# Patient Record
Sex: MALE | Employment: OTHER | URBAN - METROPOLITAN AREA
[De-identification: names, ages, dates, MRNs, and addresses within clinical notes are randomized per-mention and may not be internally consistent; named-entity substitution may affect disease eponyms.]

---

## 2023-10-02 ENCOUNTER — DOCUMENTATION (OUTPATIENT)
Dept: POST ACUTE CARE | Facility: EXTERNAL LOCATION | Age: 78
End: 2023-10-02
Payer: COMMERCIAL

## 2023-10-02 DIAGNOSIS — K21.9 GASTROESOPHAGEAL REFLUX DISEASE WITHOUT ESOPHAGITIS: Primary | ICD-10-CM

## 2023-10-02 DIAGNOSIS — E78.5 HYPERLIPIDEMIA LDL GOAL <100: ICD-10-CM

## 2023-10-02 DIAGNOSIS — J44.89 COPD (CHRONIC OBSTRUCTIVE PULMONARY DISEASE) WITH CHRONIC BRONCHITIS (MULTI): ICD-10-CM

## 2023-10-02 NOTE — PROGRESS NOTES
Patient is seen at [facility] today.  He is in usual state of health.He denies any fever or chills.  Has no headache or visual disturbances.  Denies any chest pain or palpitation.  He has some dry cough.  Denies any nausea vomiting pain abdomen.  He has No urinary symptoms.  He has chronic weakness of the legs.  He ambulates in the wheelchair in the nursing home.    Past medical history:  Hyperglycemia  Obesity  Hyperlipidemia  Anemia  Urethral foreign body  Bilateral hydronephrosis  Depression  History of tibia and fibula fracture  Obesity  Suicidal ideation  Anxiety  Knee osteoarthritis  Cerebral palsy   Benign prostatic enlargement  GERD     On examination:  General examination: There is no acute discomfort  Eyes: There is no pallor jaundice pupils are equal and reactive to light  Oral cavity throat normal  Neck: There is no JVD or carotid bruit  Lungs: Clear to auscultation  CVS: Rhythm is regular there is no gallop murmur  Abdomen: There is no organomegaly tenderness  CNS: Weakness of the legs is present  Legs: Trace of ankle advise present  Skin: No rash      Provider Impression     1. Smoking: Patient is educated about health risk including risk of lung complications malignancy and cardiovascular events.  Encouraged to stop smoking completely.  2. Hyperlipidemia: Continue with Zocor  3. Gastroesophageal reflux disease: Symptoms are controlled on Protonix  4. Hypertension: Controlled on the current dose of lisinopril and Norvasc  5. COPD: Stable on current inhalers  6. Depression: Stable on current dose of venlafaxine   7.  Overweight: Diet excise activity discussed with the patient.

## 2023-11-01 ENCOUNTER — NURSING HOME VISIT (OUTPATIENT)
Dept: POST ACUTE CARE | Facility: EXTERNAL LOCATION | Age: 78
End: 2023-11-01
Payer: COMMERCIAL

## 2023-11-01 DIAGNOSIS — R73.9 HYPERGLYCEMIA: Primary | ICD-10-CM

## 2023-11-01 DIAGNOSIS — N40.0 BENIGN PROSTATIC HYPERPLASIA WITHOUT LOWER URINARY TRACT SYMPTOMS: ICD-10-CM

## 2023-11-01 DIAGNOSIS — E78.49 OTHER HYPERLIPIDEMIA: ICD-10-CM

## 2023-11-01 PROCEDURE — 99308 SBSQ NF CARE LOW MDM 20: CPT | Performed by: INTERNAL MEDICINE

## 2023-11-01 NOTE — PROGRESS NOTES
Mr. Dinh is seen at [facility] today.  He is sitting comfortably in wheelchair.  He has no fever or chills.  Has some dry cough.  Unfortunately continues to smoke.  He has no chest pain or palpitation.  Denies any recent change in bowel habit.  He has no polyuria, polydipsia any other symptom uncontrolled hyperglycemia.  He has no polyuria, polydipsia any other symptom uncontrolled hyperglycemia.  Review of other systems are negative.    Past medical history:  Hyperglycemia  Obesity  Hyperlipidemia  Anemia  Urethral foreign body  Bilateral hydronephrosis  Depression  History of tibia and fibula fracture  Obesity  Suicidal ideation  Anxiety  Knee osteoarthritis  Cerebral palsy   Benign prostatic enlargement  GERD     On examination:  General examination: Overweight  Eyes: There is no pallor or jaundice  Vital signs: Temperature is 97.6, heart rate is 80/min, blood pressure is 124/68, respiratory rate is 17, oxygen saturation 97%, blood sugar is 138  Neck: There is no carotid bruit JVD  Lungs: Clear to auscultation  CVS: Heart sounds are regular there is no gallop murmur  Abdomen: Normal exam  CNS: Leg weakness is present  Legs: There is no edema  Skin: No rash    Assessment and plan:  1.  Hyperlipidemia: Continue the current dose of Zocor  2.  Hyperglycemia: Blood sugar levels are acceptable last hemoglobin A1c was 5.9  3.  Benign prostatic hypertrophy: Patient has no urinary symptoms we will continue with the Flomax  4.  Hypertension: Controlled on the current dose of lisinopril and Norvasc  5.  Gastroesophageal reflux disease: Symptoms are controlled on pantoprazole  6.  COPD: Stable on current inhalers  7.  Obesity: Encouraged to cut down calorie intake.  His exercise capacity is limited

## 2023-11-01 NOTE — LETTER
Patient: Gonzalez Dinh  : 1945    Encounter Date: 2023    Mr. Dinh is seen at [facility] today.  He is sitting comfortably in wheelchair.  He has no fever or chills.  Has some dry cough.  Unfortunately continues to smoke.  He has no chest pain or palpitation.  Denies any recent change in bowel habit.  He has no polyuria, polydipsia any other symptom uncontrolled hyperglycemia.  He has no polyuria, polydipsia any other symptom uncontrolled hyperglycemia.  Review of other systems are negative.    Past medical history:  Hyperglycemia  Obesity  Hyperlipidemia  Anemia  Urethral foreign body  Bilateral hydronephrosis  Depression  History of tibia and fibula fracture  Obesity  Suicidal ideation  Anxiety  Knee osteoarthritis  Cerebral palsy   Benign prostatic enlargement  GERD     On examination:  General examination: Overweight  Eyes: There is no pallor or jaundice  Vital signs: Temperature is 97.6, heart rate is 80/min, blood pressure is 124/68, respiratory rate is 17, oxygen saturation 97%, blood sugar is 138  Neck: There is no carotid bruit JVD  Lungs: Clear to auscultation  CVS: Heart sounds are regular there is no gallop murmur  Abdomen: Normal exam  CNS: Leg weakness is present  Legs: There is no edema  Skin: No rash    Assessment and plan:  1.  Hyperlipidemia: Continue the current dose of Zocor  2.  Hyperglycemia: Blood sugar levels are acceptable last hemoglobin A1c was 5.9  3.  Benign prostatic hypertrophy: Patient has no urinary symptoms we will continue with the Flomax  4.  Hypertension: Controlled on the current dose of lisinopril and Norvasc  5.  Gastroesophageal reflux disease: Symptoms are controlled on pantoprazole  6.  COPD: Stable on current inhalers  7.  Obesity: Encouraged to cut down calorie intake.  His exercise capacity is limited      Electronically Signed By: Jose Hernandez MD   23  7:25 PM

## 2023-12-02 ENCOUNTER — NURSING HOME VISIT (OUTPATIENT)
Dept: POST ACUTE CARE | Facility: EXTERNAL LOCATION | Age: 78
End: 2023-12-02
Payer: COMMERCIAL

## 2023-12-02 DIAGNOSIS — R73.9 HYPERGLYCEMIA: Primary | ICD-10-CM

## 2023-12-02 DIAGNOSIS — K21.9 GASTROESOPHAGEAL REFLUX DISEASE WITHOUT ESOPHAGITIS: ICD-10-CM

## 2023-12-02 DIAGNOSIS — I10 PRIMARY HYPERTENSION: ICD-10-CM

## 2023-12-02 DIAGNOSIS — J44.89 COPD (CHRONIC OBSTRUCTIVE PULMONARY DISEASE) WITH CHRONIC BRONCHITIS (MULTI): ICD-10-CM

## 2023-12-02 DIAGNOSIS — E78.49 OTHER HYPERLIPIDEMIA: ICD-10-CM

## 2023-12-02 PROCEDURE — 99308 SBSQ NF CARE LOW MDM 20: CPT | Performed by: INTERNAL MEDICINE

## 2023-12-02 NOTE — PROGRESS NOTES
Patient is seen at [facility] today.  He denies any fever chill anorexia.  Has some dry cough without expectoration.  Has no chest pain or palpitation.  Denies any nausea matting pain abdomen.  He gets occasional heartburn he has generalized weakness.  Denies any polyuria, polydipsia any other symptom uncontrolled hyperglycemia.  He has no urinary symptoms.  Review of other systems are negative.  He has no urinary symptoms.  Review of other systems are negative.    Past medical history:  Hyperglycemia  Obesity  Hyperlipidemia  Anemia  Urethral foreign body  Bilateral hydronephrosis  Depression  History of tibia and fibula fracture  Obesity  Suicidal ideation  Anxiety  Knee osteoarthritis  Cerebral palsy   Benign prostatic enlargement  GERD     On examination:  General examination: Overweight.  There is no acute discomfort  Vital signs: Pulse is 72/min, blood pressure 117/72, respiratory rate is 18, oxygen saturation 98% on room air  Neck: There is no carotid bruit or thyroid enlargement  Lungs: Clear to auscultation  CVS: Heart sounds are regular there is no gallop murmur  Abdomen: Soft nontender  Legs: No edema    Assessment and plan:  1.  Hypertension: Controlled on the current dose of Norvasc, lisinopril  2.  Gastroesophageal reflux disease: Symptoms are controlled on Protonix  3.  Hyperlipidemia: Continue simvastatin  4.  Chronic constipation: Continue laxatives as needed  5.  Depression: Stable on current dose of venlafaxine

## 2023-12-02 NOTE — LETTER
Patient: Gonzalez Dinh  : 1945    Encounter Date: 2023    Patient is seen at [facility] today.  He denies any fever chill anorexia.  Has some dry cough without expectoration.  Has no chest pain or palpitation.  Denies any nausea matting pain abdomen.  He gets occasional heartburn he has generalized weakness.  Denies any polyuria, polydipsia any other symptom uncontrolled hyperglycemia.  He has no urinary symptoms.  Review of other systems are negative.  He has no urinary symptoms.  Review of other systems are negative.    Past medical history:  Hyperglycemia  Obesity  Hyperlipidemia  Anemia  Urethral foreign body  Bilateral hydronephrosis  Depression  History of tibia and fibula fracture  Obesity  Suicidal ideation  Anxiety  Knee osteoarthritis  Cerebral palsy   Benign prostatic enlargement  GERD     On examination:  General examination: Overweight.  There is no acute discomfort  Vital signs: Pulse is 72/min, blood pressure 117/72, respiratory rate is 18, oxygen saturation 98% on room air  Neck: There is no carotid bruit or thyroid enlargement  Lungs: Clear to auscultation  CVS: Heart sounds are regular there is no gallop murmur  Abdomen: Soft nontender  Legs: No edema    Assessment and plan:  1.  Hypertension: Controlled on the current dose of Norvasc, lisinopril  2.  Gastroesophageal reflux disease: Symptoms are controlled on Protonix  3.  Hyperlipidemia: Continue simvastatin  4.  Chronic constipation: Continue laxatives as needed  5.  Depression: Stable on current dose of venlafaxine      Electronically Signed By: Jose Hernandez MD   23  4:15 PM

## 2024-01-08 ENCOUNTER — NURSING HOME VISIT (OUTPATIENT)
Dept: POST ACUTE CARE | Facility: EXTERNAL LOCATION | Age: 79
End: 2024-01-08
Payer: COMMERCIAL

## 2024-01-08 DIAGNOSIS — I10 PRIMARY HYPERTENSION: ICD-10-CM

## 2024-01-08 DIAGNOSIS — K21.9 GASTROESOPHAGEAL REFLUX DISEASE WITHOUT ESOPHAGITIS: ICD-10-CM

## 2024-01-08 DIAGNOSIS — E78.49 OTHER HYPERLIPIDEMIA: ICD-10-CM

## 2024-01-08 DIAGNOSIS — J44.89 COPD (CHRONIC OBSTRUCTIVE PULMONARY DISEASE) WITH CHRONIC BRONCHITIS (MULTI): ICD-10-CM

## 2024-01-08 DIAGNOSIS — R73.9 HYPERGLYCEMIA: Primary | ICD-10-CM

## 2024-01-08 PROCEDURE — 99307 SBSQ NF CARE SF MDM 10: CPT | Performed by: INTERNAL MEDICINE

## 2024-01-08 NOTE — LETTER
Patient: Gonzalez Dinh  : 1945    Encounter Date: 2024    He is seen at [facility] today he is comfortable.  Denies any fever or chills.  Has no chest pain or palpitation.  Has no shortness of breath or wheezing.  He has no nausea matting pain abdomen.  Has no polyuria, polydipsia any other symptom uncontrolled hyperglycemia.  Review of other systems are negative.    Past medical history:  Hyperglycemia  Obesity  Hyperlipidemia  Anemia  Urethral foreign body  Bilateral hydronephrosis  Depression  History of tibia and fibula fracture  Obesity  Suicidal ideation  Anxiety  Knee osteoarthritis  Cerebral palsy   Benign prostatic enlargement  GERD   On examination:  General examination: Overweight  Eyes: There is no pallor or jaundice  Lungs: Clear to auscultation  CVS: Heart sounds are regular there is no gallop murmur  Legs: No edema    Assessment and plan  1.  Hypertension: Controlled on the current dose of Norvasc, lisinopril  2.  Hyperlipidemia continue Zocor  3.  Hyperglycemia: Recent blood sugar levels are acceptable  4.  Depression: Stable on current medications  5.  History of benign prostatic hypertrophy: Patient has no urinary symptoms he is on Flomax      Electronically Signed By: Jose Hernandez MD   24  9:08 PM

## 2024-01-09 NOTE — PROGRESS NOTES
He is seen at [facility] today he is comfortable.  Denies any fever or chills.  Has no chest pain or palpitation.  Has no shortness of breath or wheezing.  He has no nausea matting pain abdomen.  Has no polyuria, polydipsia any other symptom uncontrolled hyperglycemia.  Review of other systems are negative.    Past medical history:  Hyperglycemia  Obesity  Hyperlipidemia  Anemia  Urethral foreign body  Bilateral hydronephrosis  Depression  History of tibia and fibula fracture  Obesity  Suicidal ideation  Anxiety  Knee osteoarthritis  Cerebral palsy   Benign prostatic enlargement  GERD   On examination:  General examination: Overweight  Eyes: There is no pallor or jaundice  Lungs: Clear to auscultation  CVS: Heart sounds are regular there is no gallop murmur  Legs: No edema    Assessment and plan  1.  Hypertension: Controlled on the current dose of Norvasc, lisinopril  2.  Hyperlipidemia continue Zocor  3.  Hyperglycemia: Recent blood sugar levels are acceptable  4.  Depression: Stable on current medications  5.  History of benign prostatic hypertrophy: Patient has no urinary symptoms he is on Flomax

## 2024-02-02 ENCOUNTER — NURSING HOME VISIT (OUTPATIENT)
Dept: POST ACUTE CARE | Facility: EXTERNAL LOCATION | Age: 79
End: 2024-02-02
Payer: COMMERCIAL

## 2024-02-02 DIAGNOSIS — I10 PRIMARY HYPERTENSION: Primary | ICD-10-CM

## 2024-02-02 DIAGNOSIS — K21.9 GASTROESOPHAGEAL REFLUX DISEASE WITHOUT ESOPHAGITIS: ICD-10-CM

## 2024-02-02 DIAGNOSIS — E78.49 OTHER HYPERLIPIDEMIA: ICD-10-CM

## 2024-02-02 DIAGNOSIS — R73.9 HYPERGLYCEMIA: ICD-10-CM

## 2024-02-02 DIAGNOSIS — N40.0 BENIGN PROSTATIC HYPERPLASIA WITHOUT LOWER URINARY TRACT SYMPTOMS: ICD-10-CM

## 2024-02-02 PROCEDURE — 99308 SBSQ NF CARE LOW MDM 20: CPT | Performed by: INTERNAL MEDICINE

## 2024-02-02 NOTE — LETTER
Patient: Gonzalez Dinh  : 1945    Encounter Date: 2024    Patient is seen at [facility] today.  He is comfortable.  He is moving around his wheelchair.  Has no fever or chills.  Denies any chest pain or palpitation.  Has no respiratory symptoms.  Has no urinary symptoms.  He has no polyuria, polydipsia any other symptom uncontrolled hyperglycemia.  Review of other systems are negative.    Past medical history:  Hyperglycemia  Obesity  Hyperlipidemia  Anemia  Urethral foreign body  Bilateral hydronephrosis  Depression  History of tibia and fibula fracture  Obesity  Suicidal ideation  Anxiety  Knee osteoarthritis  Cerebral palsy   Benign prostatic enlargement  GERD     On examination:  General examination: Obesity is notable  Vital signs: Temperature is 98.0, heart rate is 80, blood pressure is 124/78, respiratory rate is 18,  Eyes: There is no pallor or jaundice pupils are equal and reactive to light  Neck: There is no JVD or carotid bruit  Lungs: Clear to auscultation  CVS: Normal exam  Abdomen: Soft bowel sounds are normal  Legs: Trace of ankle edema is present    Assessment and plan:  1.  Hypertension: Controlled on the current dose of Norvasc, lisinopril  2.  Hyperlipidemia: Continue Zocor  3.  Hyperglycemia: Continue with the dietary restrictions  4.  Benign prostatic hypertrophy: Symptoms are controlled on Flomax  5.  Gastroesophageal reflux disease: Symptoms are controlled on the current dose of Protonix      Electronically Signed By: Jose Hernandez MD   24  7:54 PM

## 2024-02-03 NOTE — PROGRESS NOTES
Patient is seen at [facility] today.  He is comfortable.  He is moving around his wheelchair.  Has no fever or chills.  Denies any chest pain or palpitation.  Has no respiratory symptoms.  Has no urinary symptoms.  He has no polyuria, polydipsia any other symptom uncontrolled hyperglycemia.  Review of other systems are negative.    Past medical history:  Hyperglycemia  Obesity  Hyperlipidemia  Anemia  Urethral foreign body  Bilateral hydronephrosis  Depression  History of tibia and fibula fracture  Obesity  Suicidal ideation  Anxiety  Knee osteoarthritis  Cerebral palsy   Benign prostatic enlargement  GERD     On examination:  General examination: Obesity is notable  Vital signs: Temperature is 98.0, heart rate is 80, blood pressure is 124/78, respiratory rate is 18,  Eyes: There is no pallor or jaundice pupils are equal and reactive to light  Neck: There is no JVD or carotid bruit  Lungs: Clear to auscultation  CVS: Normal exam  Abdomen: Soft bowel sounds are normal  Legs: Trace of ankle edema is present    Assessment and plan:  1.  Hypertension: Controlled on the current dose of Norvasc, lisinopril  2.  Hyperlipidemia: Continue Zocor  3.  Hyperglycemia: Continue with the dietary restrictions  4.  Benign prostatic hypertrophy: Symptoms are controlled on Flomax  5.  Gastroesophageal reflux disease: Symptoms are controlled on the current dose of Protonix

## 2024-03-14 ENCOUNTER — NURSING HOME VISIT (OUTPATIENT)
Dept: POST ACUTE CARE | Facility: EXTERNAL LOCATION | Age: 79
End: 2024-03-14
Payer: COMMERCIAL

## 2024-03-14 DIAGNOSIS — I10 PRIMARY HYPERTENSION: Primary | ICD-10-CM

## 2024-03-14 DIAGNOSIS — E78.49 OTHER HYPERLIPIDEMIA: ICD-10-CM

## 2024-03-14 DIAGNOSIS — K21.9 GASTROESOPHAGEAL REFLUX DISEASE WITHOUT ESOPHAGITIS: ICD-10-CM

## 2024-03-14 DIAGNOSIS — N40.0 BENIGN PROSTATIC HYPERPLASIA WITHOUT LOWER URINARY TRACT SYMPTOMS: ICD-10-CM

## 2024-03-14 DIAGNOSIS — R73.9 HYPERGLYCEMIA: ICD-10-CM

## 2024-03-14 DIAGNOSIS — J44.89 COPD (CHRONIC OBSTRUCTIVE PULMONARY DISEASE) WITH CHRONIC BRONCHITIS (MULTI): ICD-10-CM

## 2024-03-14 PROCEDURE — 99308 SBSQ NF CARE LOW MDM 20: CPT | Performed by: INTERNAL MEDICINE

## 2024-03-14 NOTE — PROGRESS NOTES
Patient is seen at [facility] today.  He denies any fever chill anorexia.  Has some dry cough.  Has no chest pain or palpitation.  Denies any nausea matting pain abdomen.  Has no polyuria, polydipsia any other symptom uncontrolled hyperglycemia.  Review of other systems are negative.        Past medical history:  Hyperglycemia  Obesity  Hyperlipidemia  Anemia  Urethral foreign body  Bilateral hydronephrosis  Depression  History of tibia and fibula fracture  Obesity  Suicidal ideation  Anxiety  Knee osteoarthritis  Cerebral palsy   Benign prostatic enlargement  GERD     On examination:  General examination: Overweight  Eyes: There is no pallor or jaundice pupils are reactive to light  Neck: There is no carotid bruit or JVD  Lungs: Breath sounds are normal  CVS: Heart sounds are regular there is no gallop murmur  Abdomen: No organomegaly bowel sounds are normal  Legs: No edema    Assessment and plan  1.  Hypertension: Controlled on the current dose of lisinopril, Norvasc  2.  Hyperlipidemia continue Zocor  3.  Gastroesophageal reflux disease: Symptoms are controlled on Protonix  4.  Vitamin D deficiency: Continue vitamin D supplement  5.  Benign prostatic hypertrophy: Patient has no symptoms he is on Flomax.  6.  COPD: Stable on current inhaler

## 2024-03-14 NOTE — LETTER
Patient: Gonzalez Dinh  : 1945    Encounter Date: 2024    Patient is seen at [facility] today.  He denies any fever chill anorexia.  Has some dry cough.  Has no chest pain or palpitation.  Denies any nausea matting pain abdomen.  Has no polyuria, polydipsia any other symptom uncontrolled hyperglycemia.  Review of other systems are negative.        Past medical history:  Hyperglycemia  Obesity  Hyperlipidemia  Anemia  Urethral foreign body  Bilateral hydronephrosis  Depression  History of tibia and fibula fracture  Obesity  Suicidal ideation  Anxiety  Knee osteoarthritis  Cerebral palsy   Benign prostatic enlargement  GERD     On examination:  General examination: Overweight  Eyes: There is no pallor or jaundice pupils are reactive to light  Neck: There is no carotid bruit or JVD  Lungs: Breath sounds are normal  CVS: Heart sounds are regular there is no gallop murmur  Abdomen: No organomegaly bowel sounds are normal  Legs: No edema    Assessment and plan  1.  Hypertension: Controlled on the current dose of lisinopril, Norvasc  2.  Hyperlipidemia continue Zocor  3.  Gastroesophageal reflux disease: Symptoms are controlled on Protonix  4.  Vitamin D deficiency: Continue vitamin D supplement  5.  Benign prostatic hypertrophy: Patient has no symptoms he is on Flomax.  6.  COPD: Stable on current inhaler      Electronically Signed By: Jose Hernandez MD   3/14/24  4:43 PM

## 2024-04-14 ENCOUNTER — NURSING HOME VISIT (OUTPATIENT)
Dept: POST ACUTE CARE | Facility: EXTERNAL LOCATION | Age: 79
End: 2024-04-14
Payer: COMMERCIAL

## 2024-04-14 DIAGNOSIS — K21.9 GASTROESOPHAGEAL REFLUX DISEASE WITHOUT ESOPHAGITIS: ICD-10-CM

## 2024-04-14 DIAGNOSIS — E55.9 VITAMIN D DEFICIENCY: ICD-10-CM

## 2024-04-14 DIAGNOSIS — N40.0 BENIGN PROSTATIC HYPERPLASIA WITHOUT LOWER URINARY TRACT SYMPTOMS: ICD-10-CM

## 2024-04-14 DIAGNOSIS — I10 PRIMARY HYPERTENSION: Primary | ICD-10-CM

## 2024-04-14 DIAGNOSIS — E78.5 HYPERLIPIDEMIA LDL GOAL <100: ICD-10-CM

## 2024-04-14 DIAGNOSIS — J44.89 COPD (CHRONIC OBSTRUCTIVE PULMONARY DISEASE) WITH CHRONIC BRONCHITIS (MULTI): ICD-10-CM

## 2024-04-14 PROCEDURE — 99308 SBSQ NF CARE LOW MDM 20: CPT | Performed by: INTERNAL MEDICINE

## 2024-04-14 NOTE — PROGRESS NOTES
Mr. Dinh seen at [facility] today.  He is comfortable.  Denies any fever or chills.  Has no respiratory symptoms.  Has no chest pain or palpitation.  Has no shortness of breath or wheezing.  Denies any nausea matting pain abdomen.  He has no urinary symptoms.  Review of other system's are negative.    Past medical history:  Hyperglycemia  Obesity  Hyperlipidemia  Anemia  Urethral foreign body  Bilateral hydronephrosis  Depression  History of tibia and fibula fracture  Obesity  Suicidal ideation  Anxiety  Knee osteoarthritis  Cerebral palsy   Benign prostatic enlargement  GERD     On examination:  General examination: Normal  Vital signs: Temperature is 97.8, heart rate is 70/min, blood pressure is 132/76,  Eyes: There is no pallor or jaundice pupils equal and reactive to light  Lungs: Clear to auscultation  CVS: Heart sounds are regular there is no gallop murmur.  Abdomen: Normal exam  Legs: No edema    Assessment and plan  1.  Hypertension: Controlled on the current dose of lisinopril and Norvasc  2.  Hyperlipidemia: Continue simvastatin  3.  Depression: Stable on current medications  4.  History of vitamin D deficiency: Continue with the current supplement  5.  Gastroesophageal reflux disease: Symptoms are controlled on pantoprazole  6.  History of benign prostatic hypertrophy: Has no urinary symptoms.  He is on Flomax

## 2024-04-14 NOTE — LETTER
Patient: Gonzalez Dinh  : 1945    Encounter Date: 2024    Mr. Dinh seen at [facility] today.  He is comfortable.  Denies any fever or chills.  Has no respiratory symptoms.  Has no chest pain or palpitation.  Has no shortness of breath or wheezing.  Denies any nausea matting pain abdomen.  He has no urinary symptoms.  Review of other system's are negative.    Past medical history:  Hyperglycemia  Obesity  Hyperlipidemia  Anemia  Urethral foreign body  Bilateral hydronephrosis  Depression  History of tibia and fibula fracture  Obesity  Suicidal ideation  Anxiety  Knee osteoarthritis  Cerebral palsy   Benign prostatic enlargement  GERD     On examination:  General examination: Normal  Vital signs: Temperature is 97.8, heart rate is 70/min, blood pressure is 132/76,  Eyes: There is no pallor or jaundice pupils equal and reactive to light  Lungs: Clear to auscultation  CVS: Heart sounds are regular there is no gallop murmur.  Abdomen: Normal exam  Legs: No edema    Assessment and plan  1.  Hypertension: Controlled on the current dose of lisinopril and Norvasc  2.  Hyperlipidemia: Continue simvastatin  3.  Depression: Stable on current medications  4.  History of vitamin D deficiency: Continue with the current supplement  5.  Gastroesophageal reflux disease: Symptoms are controlled on pantoprazole  6.  History of benign prostatic hypertrophy: Has no urinary symptoms.  He is on Flomax      Electronically Signed By: Jose Hernandez MD   24  4:26 PM

## 2024-05-18 ENCOUNTER — NURSING HOME VISIT (OUTPATIENT)
Dept: POST ACUTE CARE | Facility: EXTERNAL LOCATION | Age: 79
End: 2024-05-18
Payer: COMMERCIAL

## 2024-05-18 DIAGNOSIS — K21.9 GASTROESOPHAGEAL REFLUX DISEASE WITHOUT ESOPHAGITIS: ICD-10-CM

## 2024-05-18 DIAGNOSIS — I10 PRIMARY HYPERTENSION: Primary | ICD-10-CM

## 2024-05-18 DIAGNOSIS — J44.89 COPD (CHRONIC OBSTRUCTIVE PULMONARY DISEASE) WITH CHRONIC BRONCHITIS (MULTI): ICD-10-CM

## 2024-05-18 DIAGNOSIS — E78.5 HYPERLIPIDEMIA LDL GOAL <100: ICD-10-CM

## 2024-05-18 DIAGNOSIS — R73.9 HYPERGLYCEMIA: ICD-10-CM

## 2024-05-18 PROCEDURE — 99308 SBSQ NF CARE LOW MDM 20: CPT | Performed by: INTERNAL MEDICINE

## 2024-05-18 NOTE — PROGRESS NOTES
Patient is seen at [facility] today.  He denies any recent changes in health.  He has no cough expectoration.  Denies any chest pain or palpitation.  Has no nausea vomiting pain abdomen.  He has no polyuria, polydipsia any other symptom uncontrolled hyperglycemia.  Complains of occasional heartburn.  Review of other systems are negative.     Past medical history:  Hyperglycemia  Obesity  Hyperlipidemia  Anemia  Urethral foreign body  Bilateral hydronephrosis  Depression  History of tibia and fibula fracture  Obesity  Suicidal ideation  Anxiety  Knee osteoarthritis  Cerebral palsy   Benign prostatic enlargement  GERD     On examination:  General examination: Overweight  Vital signs: Heart rate is 70/min, respiratory rate is 18, blood pressure is 129/78,  Eyes: There is no pallor or jaundice  Lungs: Clear to auscultation  CVS: Rhythm is regular there is no gallop murmur  Legs: No edema  Skin: No rash    Assessment and plan:  1.  Gastroesophageal reflux disease: Symptoms are controlled on pantoprazole  2.  Hypertension: Controlled on lisinopril, Norvasc  3.  Hyperlipidemia: Continue Zocor  4.  Depression: Stable on current medications  5.  COPD: Continue Spiriva Respimat

## 2024-05-18 NOTE — LETTER
Patient: Gonzalez Dinh  : 1945    Encounter Date: 2024    Patient is seen at [facility] today.  He denies any recent changes in health.  He has no cough expectoration.  Denies any chest pain or palpitation.  Has no nausea vomiting pain abdomen.  He has no polyuria, polydipsia any other symptom uncontrolled hyperglycemia.  Complains of occasional heartburn.  Review of other systems are negative.     Past medical history:  Hyperglycemia  Obesity  Hyperlipidemia  Anemia  Urethral foreign body  Bilateral hydronephrosis  Depression  History of tibia and fibula fracture  Obesity  Suicidal ideation  Anxiety  Knee osteoarthritis  Cerebral palsy   Benign prostatic enlargement  GERD     On examination:  General examination: Overweight  Vital signs: Heart rate is 70/min, respiratory rate is 18, blood pressure is 129/78,  Eyes: There is no pallor or jaundice  Lungs: Clear to auscultation  CVS: Rhythm is regular there is no gallop murmur  Legs: No edema  Skin: No rash    Assessment and plan:  1.  Gastroesophageal reflux disease: Symptoms are controlled on pantoprazole  2.  Hypertension: Controlled on lisinopril, Norvasc  3.  Hyperlipidemia: Continue Zocor  4.  Depression: Stable on current medications  5.  COPD: Continue Spiriva Respimat      Electronically Signed By: Jose Hernandez MD   24  4:42 PM

## 2024-06-22 ENCOUNTER — NURSING HOME VISIT (OUTPATIENT)
Dept: POST ACUTE CARE | Facility: EXTERNAL LOCATION | Age: 79
End: 2024-06-22
Payer: COMMERCIAL

## 2024-06-22 DIAGNOSIS — R33.9 URINARY RETENTION: Primary | ICD-10-CM

## 2024-06-22 DIAGNOSIS — J44.89 COPD (CHRONIC OBSTRUCTIVE PULMONARY DISEASE) WITH CHRONIC BRONCHITIS (MULTI): ICD-10-CM

## 2024-06-22 DIAGNOSIS — R73.9 HYPERGLYCEMIA: ICD-10-CM

## 2024-06-22 DIAGNOSIS — E78.49 OTHER HYPERLIPIDEMIA: ICD-10-CM

## 2024-06-22 DIAGNOSIS — I10 PRIMARY HYPERTENSION: ICD-10-CM

## 2024-06-22 DIAGNOSIS — F32.A CHRONIC DEPRESSION: ICD-10-CM

## 2024-06-22 DIAGNOSIS — N47.1 PHIMOSIS OF PENIS: ICD-10-CM

## 2024-06-22 NOTE — LETTER
Patient: Gonzalez Dinh  : 1945    Encounter Date: 2024    Patient is seen at [facility] today.  He was recently sent to the ER because of the urinary retention and swelling of the penis.  He was found to have phimosis.  Toney's catheter was placed in the emergency room and arrangement was made for him to be followed by his urologist  in office.  Patient was also found to have a UTI patient he is having no discomfort is still have the indwelling Toney's catheter.  He has no fever or chill.  Denies any chest pain or palpitation.  Has no shortness of breath or wheezing.  Has no polyuria, polydipsia any other symptom uncontrolled hyperglycemia.  His depression symptoms are under good control.  Review of other systems are negative.    Past medical history:      Urinary retention      Phimosis  Hyperglycemia  Obesity  Hyperlipidemia  Anemia  Urethral foreign body  Bilateral hydronephrosis  Depression  History of tibia and fibula fracture  Obesity  Suicidal ideation  Anxiety  Knee osteoarthritis  Cerebral palsy   Benign prostatic enlargement  GERD     On examination:  General examination: There is no acute discomfort  Eyes: There is no pallor or jaundice  Neck: There is no JVD or carotid bruit  Lungs: Clear to auscultation  CVS: Rhythm is regular there is no gallop murmur  Abdomen: Soft there is no tenderness   system indwelling Toney's catheter is in place  Legs: No edema    Assessment and plan  1.  Urinary retention: Patient has Toney's catheter.  He has a follow-up appointment with urologist Dr. Slater in office.  2.  Phimosis: Await urology opinion  3.  Hypertension: Controlled on the current dose of lisinopril and Norvasc  4.  Hyperlipidemia: Continue Zocor  5.  Depression: Is stable on current dose of venlafaxine  6.  Hyperglycemia: Recent blood sugar levels are acceptable  7.  COPD: Stable on current inhaler.  Counseled about smoking cessation        Electronically  Signed By: Jose Hernandez MD   6/22/24  6:19 PM

## 2024-06-22 NOTE — PROGRESS NOTES
Patient is seen at [facility] today.  He was recently sent to the ER because of the urinary retention and swelling of the penis.  He was found to have phimosis.  Toney's catheter was placed in the emergency room and arrangement was made for him to be followed by his urologist  in office.  Patient was also found to have a UTI patient he is having no discomfort is still have the indwelling Toney's catheter.  He has no fever or chill.  Denies any chest pain or palpitation.  Has no shortness of breath or wheezing.  Has no polyuria, polydipsia any other symptom uncontrolled hyperglycemia.  His depression symptoms are under good control.  Review of other systems are negative.    Past medical history:      Urinary retention      Phimosis  Hyperglycemia  Obesity  Hyperlipidemia  Anemia  Urethral foreign body  Bilateral hydronephrosis  Depression  History of tibia and fibula fracture  Obesity  Suicidal ideation  Anxiety  Knee osteoarthritis  Cerebral palsy   Benign prostatic enlargement  GERD     On examination:  General examination: There is no acute discomfort  Eyes: There is no pallor or jaundice  Neck: There is no JVD or carotid bruit  Lungs: Clear to auscultation  CVS: Rhythm is regular there is no gallop murmur  Abdomen: Soft there is no tenderness  July 20 system indwelling Toney's catheter is in place  Legs: No edema    Assessment and plan  1.  Urinary retention: Patient has Toney's catheter.  He has a follow-up appointment with urologist Dr. Slater in office.  2.  Phimosis: Await urology opinion  3.  Hypertension: Controlled on the current dose of lisinopril and Norvasc  4.  Hyperlipidemia: Continue Zocor  5.  Depression: Is stable on current dose of venlafaxine  6.  Hyperglycemia: Recent blood sugar levels are acceptable  7.  COPD: Stable on current inhaler.  Counseled about smoking cessation

## 2024-07-07 ENCOUNTER — NURSING HOME VISIT (OUTPATIENT)
Dept: POST ACUTE CARE | Facility: EXTERNAL LOCATION | Age: 79
End: 2024-07-07
Payer: COMMERCIAL

## 2024-07-07 DIAGNOSIS — I10 PRIMARY HYPERTENSION: ICD-10-CM

## 2024-07-07 DIAGNOSIS — E78.49 OTHER HYPERLIPIDEMIA: ICD-10-CM

## 2024-07-07 DIAGNOSIS — R33.9 URINARY RETENTION: ICD-10-CM

## 2024-07-07 DIAGNOSIS — K21.9 GASTROESOPHAGEAL REFLUX DISEASE WITHOUT ESOPHAGITIS: ICD-10-CM

## 2024-07-07 DIAGNOSIS — R73.9 HYPERGLYCEMIA: ICD-10-CM

## 2024-07-07 DIAGNOSIS — N47.1 PHIMOSIS OF PENIS: Primary | ICD-10-CM

## 2024-07-07 PROCEDURE — 99308 SBSQ NF CARE LOW MDM 20: CPT | Performed by: INTERNAL MEDICINE

## 2024-07-07 NOTE — LETTER
Patient: Gonzalez Dinh  : 1945    Encounter Date: 2024    Patient is seen at [facility] today.  He still has indwelling urinary catheter.  He has an appointment with the urologist on the  of this month.  There is no problem in the drainage of the urine color of urine is normal.  He has no fever or chill.  Has no flank pain or.  Denies any chest pain or palpitation.  Has no respiratory symptoms.  Has some pain in the knee joints.  Review of other systems are negative.    Past medical history:      Urinary retention      Phimosis  Hyperglycemia  Obesity  Hyperlipidemia  Anemia  Urethral foreign body  Bilateral hydronephrosis  Depression  History of tibia and fibula fracture  Obesity  Suicidal ideation  Anxiety  Knee osteoarthritis  Cerebral palsy   Benign prostatic enlargement  GERD     On examination:  General examination: Normal  Vital signs: Heart rate is 70/min, blood pressure is 136/80, blood sugar is 138,  Eyes: There is no pallor or jaundice  Lungs: Clear to auscultation  CVS: Rhythm is regular there is no gallop or murmur  Abdomen: Soft nontender  Genitalia system there is no CVA tenderness  Legs: No edema    Assessment and plan:  1.  Phimosis: Patient has urinary retention has indwelling Toney's catheter.  She has an appointment with urologist later this month  2.  Hypertension: Controlled on the current dose of lisinopril, Norvasc  3.  Hyperlipidemia: Continue Zocor  4.  Gastroesophageal reflux disease: Symptoms are controlled on Protonix  5.  COPD: Is stable on current inhalers      Electronically Signed By: Jose Hernandez MD   24  7:09 PM

## 2024-07-07 NOTE — PROGRESS NOTES
Patient is seen at [facility] today.  He still has indwelling urinary catheter.  He has an appointment with the urologist on the 19th of this month.  There is no problem in the drainage of the urine color of urine is normal.  He has no fever or chill.  Has no flank pain or.  Denies any chest pain or palpitation.  Has no respiratory symptoms.  Has some pain in the knee joints.  Review of other systems are negative.    Past medical history:      Urinary retention      Phimosis  Hyperglycemia  Obesity  Hyperlipidemia  Anemia  Urethral foreign body  Bilateral hydronephrosis  Depression  History of tibia and fibula fracture  Obesity  Suicidal ideation  Anxiety  Knee osteoarthritis  Cerebral palsy   Benign prostatic enlargement  GERD     On examination:  General examination: Normal  Vital signs: Heart rate is 70/min, blood pressure is 136/80, blood sugar is 138,  Eyes: There is no pallor or jaundice  Lungs: Clear to auscultation  CVS: Rhythm is regular there is no gallop or murmur  Abdomen: Soft nontender  Genitalia system there is no CVA tenderness  Legs: No edema    Assessment and plan:  1.  Phimosis: Patient has urinary retention has indwelling Toney's catheter.  She has an appointment with urologist later this month  2.  Hypertension: Controlled on the current dose of lisinopril, Norvasc  3.  Hyperlipidemia: Continue Zocor  4.  Gastroesophageal reflux disease: Symptoms are controlled on Protonix  5.  COPD: Is stable on current inhalers

## 2024-08-10 ENCOUNTER — NURSING HOME VISIT (OUTPATIENT)
Dept: POST ACUTE CARE | Facility: EXTERNAL LOCATION | Age: 79
End: 2024-08-10
Payer: COMMERCIAL

## 2024-08-10 DIAGNOSIS — E78.49 OTHER HYPERLIPIDEMIA: ICD-10-CM

## 2024-08-10 DIAGNOSIS — K21.9 GASTROESOPHAGEAL REFLUX DISEASE WITHOUT ESOPHAGITIS: ICD-10-CM

## 2024-08-10 DIAGNOSIS — I10 PRIMARY HYPERTENSION: Primary | ICD-10-CM

## 2024-08-10 DIAGNOSIS — N47.1 PHIMOSIS OF PENIS: ICD-10-CM

## 2024-08-10 DIAGNOSIS — R33.9 URINARY RETENTION: ICD-10-CM

## 2024-08-10 DIAGNOSIS — F41.9 ANXIETY: ICD-10-CM

## 2024-08-10 DIAGNOSIS — R73.9 HYPERGLYCEMIA: ICD-10-CM

## 2024-08-10 PROCEDURE — 99308 SBSQ NF CARE LOW MDM 20: CPT | Performed by: INTERNAL MEDICINE

## 2024-08-10 NOTE — PROGRESS NOTES
Mr. Dinh is seen at [facility] today.  He denies any fever chill Anexsia.  Has no chest pain or palpitation.  No shortness of breath or wheezing.  Denies any nausea matting pain abdomen.  He is still has indwelling Toney's catheter.  He denies any polyuria, polydipsia any other symptom uncontrolled hyperglycemia.  Review of other systems are negative.    Past medical history:      Urinary retention      Phimosis  Hyperglycemia  Obesity  Hyperlipidemia  Anemia  Urethral foreign body  Bilateral hydronephrosis  Depression  History of tibia and fibula fracture  Obesity  Suicidal ideation  Anxiety  Knee osteoarthritis  Cerebral palsy   Benign prostatic enlargement  GERD     Assessment and plan:  1.  Hypertension: Controlled on the current dose of lisinopril, Norvasc  2.  Hyperlipidemia: Continue simvastatin  3.  Hyperglycemia: Recent hemoglobin A1c level is 5.6  4.  Phimosis: Patient had urinary obstruction.  He has indwelling Toney's catheter.  He has a follow-up appointment with his urologist  5.  Anxiety: Continue buspirone  6.  Depression: Is stable on current dose of venlafaxine

## 2024-08-10 NOTE — LETTER
Patient: Gonzalez Dinh  : 1945    Encounter Date: 08/10/2024    Mr. Dinh is seen at [facility] today.  He denies any fever chill Anexsia.  Has no chest pain or palpitation.  No shortness of breath or wheezing.  Denies any nausea matting pain abdomen.  He is still has indwelling Toney's catheter.  He denies any polyuria, polydipsia any other symptom uncontrolled hyperglycemia.  Review of other systems are negative.    Past medical history:      Urinary retention      Phimosis  Hyperglycemia  Obesity  Hyperlipidemia  Anemia  Urethral foreign body  Bilateral hydronephrosis  Depression  History of tibia and fibula fracture  Obesity  Suicidal ideation  Anxiety  Knee osteoarthritis  Cerebral palsy   Benign prostatic enlargement  GERD     Assessment and plan:  1.  Hypertension: Controlled on the current dose of lisinopril, Norvasc  2.  Hyperlipidemia: Continue simvastatin  3.  Hyperglycemia: Recent hemoglobin A1c level is 5.6  4.  Phimosis: Patient had urinary obstruction.  He has indwelling Toney's catheter.  He has a follow-up appointment with his urologist  5.  Anxiety: Continue buspirone  6.  Depression: Is stable on current dose of venlafaxine      Electronically Signed By: Jose Hernandez MD   8/10/24  5:08 PM

## 2024-09-01 ENCOUNTER — NURSING HOME VISIT (OUTPATIENT)
Dept: POST ACUTE CARE | Facility: EXTERNAL LOCATION | Age: 79
End: 2024-09-01
Payer: COMMERCIAL

## 2024-09-01 DIAGNOSIS — I10 PRIMARY HYPERTENSION: Primary | ICD-10-CM

## 2024-09-01 DIAGNOSIS — R33.9 URINARY RETENTION: ICD-10-CM

## 2024-09-01 DIAGNOSIS — J44.89 COPD (CHRONIC OBSTRUCTIVE PULMONARY DISEASE) WITH CHRONIC BRONCHITIS (MULTI): ICD-10-CM

## 2024-09-01 DIAGNOSIS — N47.1 PHIMOSIS OF PENIS: ICD-10-CM

## 2024-09-01 DIAGNOSIS — N40.0 BENIGN PROSTATIC HYPERPLASIA WITHOUT LOWER URINARY TRACT SYMPTOMS: ICD-10-CM

## 2024-09-01 DIAGNOSIS — E78.49 OTHER HYPERLIPIDEMIA: ICD-10-CM

## 2024-09-01 DIAGNOSIS — F32.A CHRONIC DEPRESSION: ICD-10-CM

## 2024-09-01 PROCEDURE — 99308 SBSQ NF CARE LOW MDM 20: CPT | Performed by: INTERNAL MEDICINE

## 2024-09-01 NOTE — PROGRESS NOTES
Patient is seen at [facility] today.  He denies any acute discomfort.  Still has indwelling Toney's catheter.  He has appointment with urologist later this month.  He denies any fever or chills.  Has no chest pain or palpitation has some dry cough.  No nausea matting pain abdomen.  He has no polyuria, polydipsia any other symptom uncontrolled hyperglycemia.  His depression symptoms are under good control.  Review of other systems are negative.    Past medical history:      Urinary retention      Phimosis  Hyperglycemia  Obesity  Hyperlipidemia  Anemia  Urethral foreign body  Bilateral hydronephrosis  Depression  History of tibia and fibula fracture  Obesity  Suicidal ideation  Anxiety  Knee osteoarthritis  Cerebral palsy   Benign prostatic enlargement  GERD     On examination:  General examination: There is no acute discomfort  Eyes: There is no pallor or jaundice pupils are equal and reactive to light  Neck: There is no JVD or carotid bruit  Lungs: Clear to auscultation  CVS: Heart sounds are regular there is no gallop murmur  Abdomen: Soft there is no tenderness  Genitalia system indwelling Toney's catheter.  The urine color is normal in the bag    Assessment and plan:  1.  Benign prostatic hypertrophy: Continue with the current dose of Flomax  2.  Urinary retention phimosis: Patient has indwelling Toney catheter.  He is being followed by urologist  3.  Hypertension: Controlled on the current dose of lisinopril, Norvasc  4.  Hyperlipidemia continued on current dose of Zocor  5.  COPD: Is stable on current inhalers  6.  Depression: Stable on current dose of venlafaxine

## 2024-09-01 NOTE — LETTER
Patient: Gonzalez Dinh  : 1945    Encounter Date: 2024    Patient is seen at [facility] today.  He denies any acute discomfort.  Still has indwelling Toney's catheter.  He has appointment with urologist later this month.  He denies any fever or chills.  Has no chest pain or palpitation has some dry cough.  No nausea matting pain abdomen.  He has no polyuria, polydipsia any other symptom uncontrolled hyperglycemia.  His depression symptoms are under good control.  Review of other systems are negative.    Past medical history:      Urinary retention      Phimosis  Hyperglycemia  Obesity  Hyperlipidemia  Anemia  Urethral foreign body  Bilateral hydronephrosis  Depression  History of tibia and fibula fracture  Obesity  Suicidal ideation  Anxiety  Knee osteoarthritis  Cerebral palsy   Benign prostatic enlargement  GERD     On examination:  General examination: There is no acute discomfort  Eyes: There is no pallor or jaundice pupils are equal and reactive to light  Neck: There is no JVD or carotid bruit  Lungs: Clear to auscultation  CVS: Heart sounds are regular there is no gallop murmur  Abdomen: Soft there is no tenderness  Genitalia system indwelling Toney's catheter.  The urine color is normal in the bag    Assessment and plan:  1.  Benign prostatic hypertrophy: Continue with the current dose of Flomax  2.  Urinary retention phimosis: Patient has indwelling Toney catheter.  He is being followed by urologist  3.  Hypertension: Controlled on the current dose of lisinopril, Norvasc  4.  Hyperlipidemia continued on current dose of Zocor  5.  COPD: Is stable on current inhalers  6.  Depression: Stable on current dose of venlafaxine      Electronically Signed By: Jose Hernandez MD   24  6:18 PM

## 2024-10-05 ENCOUNTER — NURSING HOME VISIT (OUTPATIENT)
Dept: POST ACUTE CARE | Facility: EXTERNAL LOCATION | Age: 79
End: 2024-10-05
Payer: COMMERCIAL

## 2024-10-05 DIAGNOSIS — N47.1 PHIMOSIS OF PENIS: ICD-10-CM

## 2024-10-05 DIAGNOSIS — E78.49 OTHER HYPERLIPIDEMIA: ICD-10-CM

## 2024-10-05 DIAGNOSIS — I10 PRIMARY HYPERTENSION: Primary | ICD-10-CM

## 2024-10-05 DIAGNOSIS — J44.89 COPD (CHRONIC OBSTRUCTIVE PULMONARY DISEASE) WITH CHRONIC BRONCHITIS (MULTI): ICD-10-CM

## 2024-10-05 DIAGNOSIS — R33.9 URINARY RETENTION: ICD-10-CM

## 2024-10-05 DIAGNOSIS — F32.A CHRONIC DEPRESSION: ICD-10-CM

## 2024-10-05 DIAGNOSIS — K21.9 GASTROESOPHAGEAL REFLUX DISEASE WITHOUT ESOPHAGITIS: ICD-10-CM

## 2024-10-05 NOTE — PROGRESS NOTES
Past medical history:      Urinary retention      Phimosis  Hyperglycemia  Obesity  Hyperlipidemia  Anemia  Urethral foreign body  Bilateral hydronephrosis  Depression  History of tibia and fibula fracture  Obesity  Suicidal ideation  Anxiety  Knee osteoarthritis  Cerebral palsy   Benign prostatic enlargement  GERD     On examination:  General examination: Overweight  Eyes: There is no pallor or jaundice pupils are equal and reactive to light  Neck: There is no JVD or thyroid enlargement  Lungs: Breath sounds are normal  CVS: Rhythm is regular there is no gallop murmur  Abdomen: Normal exam  Genitourinary system there is no CVA tenderness  CNS: Awake alert, generalized weakness  Legs: No edema    Assessment and plan  1.  Phimosis and urinary retention: Patient's urinary catheter is out he is able to void urine without any difficulty  2.  Hypertension: Controlled on the current dose of lisinopril, Norvasc  3.  Hyperlipidemia continue with the simvastatin  4.  Gastroesophageal reflux disease: Symptoms are controlled on the Protonix  5.  Depression: Is stable on current dose of venlafaxine  6.  Chronic constipation: Continue laxatives as needed  7.  COPD: Is stable on current inhalers

## 2024-10-05 NOTE — LETTER
Patient: Gonzalez Dinh  : 1945    Encounter Date: 10/05/2024        Past medical history:      Urinary retention      Phimosis  Hyperglycemia  Obesity  Hyperlipidemia  Anemia  Urethral foreign body  Bilateral hydronephrosis  Depression  History of tibia and fibula fracture  Obesity  Suicidal ideation  Anxiety  Knee osteoarthritis  Cerebral palsy   Benign prostatic enlargement  GERD     On examination:  General examination: Overweight  Eyes: There is no pallor or jaundice pupils are equal and reactive to light  Neck: There is no JVD or thyroid enlargement  Lungs: Breath sounds are normal  CVS: Rhythm is regular there is no gallop murmur  Abdomen: Normal exam  Genitourinary system there is no CVA tenderness  CNS: Awake alert, generalized weakness  Legs: No edema    Assessment and plan  1.  Phimosis and urinary retention: Patient's urinary catheter is out he is able to void urine without any difficulty  2.  Hypertension: Controlled on the current dose of lisinopril, Norvasc  3.  Hyperlipidemia continue with the simvastatin  4.  Gastroesophageal reflux disease: Symptoms are controlled on the Protonix  5.  Depression: Is stable on current dose of venlafaxine  6.  Chronic constipation: Continue laxatives as needed  7.  COPD: Is stable on current inhalers      Electronically Signed By: Jose Hernandez MD   10/5/24  5:49 PM

## 2024-11-09 ENCOUNTER — NURSING HOME VISIT (OUTPATIENT)
Dept: POST ACUTE CARE | Facility: EXTERNAL LOCATION | Age: 79
End: 2024-11-09
Payer: COMMERCIAL

## 2024-11-09 DIAGNOSIS — F32.A CHRONIC DEPRESSION: ICD-10-CM

## 2024-11-09 DIAGNOSIS — I10 PRIMARY HYPERTENSION: Primary | ICD-10-CM

## 2024-11-09 DIAGNOSIS — E78.49 OTHER HYPERLIPIDEMIA: ICD-10-CM

## 2024-11-09 DIAGNOSIS — F17.200 CURRENT SMOKER: ICD-10-CM

## 2024-11-09 DIAGNOSIS — K21.9 GASTROESOPHAGEAL REFLUX DISEASE WITHOUT ESOPHAGITIS: ICD-10-CM

## 2024-11-09 DIAGNOSIS — R33.9 URINARY RETENTION: ICD-10-CM

## 2024-11-09 DIAGNOSIS — N40.0 BENIGN PROSTATIC HYPERPLASIA WITHOUT LOWER URINARY TRACT SYMPTOMS: ICD-10-CM

## 2024-11-09 PROCEDURE — 99308 SBSQ NF CARE LOW MDM 20: CPT | Performed by: INTERNAL MEDICINE

## 2024-11-09 NOTE — PROGRESS NOTES
Is seen at [facility] today.  He is in usual state of health.  Did not have any further episode of urinary retention.  He has no fever or chill.  Denies any chest pain or palpitation.  Has no shortness of breath or wheezing.  Has no nausea matting pain abdomen.  He has no new onset weakness of any extremity.  Denies any polyuria, polydipsia any other symptom uncontrolled hyperglycemia.  Depression symptoms are under good control.  Review of other systems are negative.     Past medical history:      Urinary retention      Phimosis  Hyperglycemia  Obesity  Hyperlipidemia  Anemia  Urethral foreign body  Bilateral hydronephrosis  Depression  History of tibia and fibula fracture  Obesity  Suicidal ideation  Anxiety  Knee osteoarthritis  Cerebral palsy   Benign prostatic enlargement  GERD     On examination: General Examination: Normal  Eyes: There is no pallor or jaundice pupils are equal and reactive to light  Oral cavity throat normal  Neck: There is no carotid bruit or JVD  Lungs: Clear to auscultation  CVS: Rhythm is regular there is no gallop murmur  Abdomen: Soft there is no tenderness  CNS: No focal weakness  Legs: Trace of ankle edema is present    Assessment and plan:  1.  History of urinary retention: Patient's catheter is out.  He has no difficulty passing urine at this time  2.  Hypertension: Controlled on the current dose of lisinopril and Norvasc  3.  Gastroesophageal reflux disease: Symptoms are controlled on pantoprazole  4.  Chronic smoker: Counseled about smoking cessation  5.  Hyperlipidemia: Continue Zocor  6.  Benign prostatic hypertrophy: Patient is on tamsulosin  7.  Depression: Stable on the current dose of venlafaxine

## 2024-11-09 NOTE — LETTER
Patient: Gonzalez Dinh  : 1945    Encounter Date: 2024    Is seen at [facility] today.  He is in usual state of health.  Did not have any further episode of urinary retention.  He has no fever or chill.  Denies any chest pain or palpitation.  Has no shortness of breath or wheezing.  Has no nausea matting pain abdomen.  He has no new onset weakness of any extremity.  Denies any polyuria, polydipsia any other symptom uncontrolled hyperglycemia.  Depression symptoms are under good control.  Review of other systems are negative.     Past medical history:      Urinary retention      Phimosis  Hyperglycemia  Obesity  Hyperlipidemia  Anemia  Urethral foreign body  Bilateral hydronephrosis  Depression  History of tibia and fibula fracture  Obesity  Suicidal ideation  Anxiety  Knee osteoarthritis  Cerebral palsy   Benign prostatic enlargement  GERD     On examination: General Examination: Normal  Eyes: There is no pallor or jaundice pupils are equal and reactive to light  Oral cavity throat normal  Neck: There is no carotid bruit or JVD  Lungs: Clear to auscultation  CVS: Rhythm is regular there is no gallop murmur  Abdomen: Soft there is no tenderness  CNS: No focal weakness  Legs: Trace of ankle edema is present    Assessment and plan:  1.  History of urinary retention: Patient's catheter is out.  He has no difficulty passing urine at this time  2.  Hypertension: Controlled on the current dose of lisinopril and Norvasc  3.  Gastroesophageal reflux disease: Symptoms are controlled on pantoprazole  4.  Chronic smoker: Counseled about smoking cessation  5.  Hyperlipidemia: Continue Zocor  6.  Benign prostatic hypertrophy: Patient is on tamsulosin  7.  Depression: Stable on the current dose of venlafaxine      Electronically Signed By: Jose Hernandez MD   24  5:54 PM

## 2024-12-01 ENCOUNTER — NURSING HOME VISIT (OUTPATIENT)
Dept: POST ACUTE CARE | Facility: EXTERNAL LOCATION | Age: 79
End: 2024-12-01
Payer: MEDICARE

## 2024-12-01 DIAGNOSIS — K21.9 GASTROESOPHAGEAL REFLUX DISEASE WITHOUT ESOPHAGITIS: ICD-10-CM

## 2024-12-01 DIAGNOSIS — R73.9 HYPERGLYCEMIA: ICD-10-CM

## 2024-12-01 DIAGNOSIS — E78.49 OTHER HYPERLIPIDEMIA: ICD-10-CM

## 2024-12-01 DIAGNOSIS — F32.A CHRONIC DEPRESSION: ICD-10-CM

## 2024-12-01 DIAGNOSIS — R33.9 URINARY RETENTION: ICD-10-CM

## 2024-12-01 DIAGNOSIS — F17.200 CURRENT SMOKER: ICD-10-CM

## 2024-12-01 DIAGNOSIS — J44.89 COPD (CHRONIC OBSTRUCTIVE PULMONARY DISEASE) WITH CHRONIC BRONCHITIS (MULTI): ICD-10-CM

## 2024-12-01 DIAGNOSIS — N40.0 BENIGN PROSTATIC HYPERPLASIA WITHOUT LOWER URINARY TRACT SYMPTOMS: ICD-10-CM

## 2024-12-01 DIAGNOSIS — I10 PRIMARY HYPERTENSION: Primary | ICD-10-CM

## 2024-12-01 PROCEDURE — 99308 SBSQ NF CARE LOW MDM 20: CPT | Performed by: INTERNAL MEDICINE

## 2024-12-01 NOTE — PROGRESS NOTES
Patient is seen at [facility] today.  He is in usual state of health.  Denies any fever or chill.  Has no chest pain or palpitation.  Has no cough expectoration.  Denies any nausea vomiting pain abdomen.  He has no polyuria, polydipsia any other symptom uncontrolled hyperglycemia.  He did not have any difficulty passing urine after removal of the catheter.  He has recently seen his urologist.  He gets some knee pain off and on.  Has no weakness of any extremity.  Review of other systems are negative.    Past medical history:      Urinary retention      Phimosis  Hyperglycemia  Obesity  Hyperlipidemia  Anemia  Urethral foreign body  Bilateral hydronephrosis  Depression  History of tibia and fibula fracture  Obesity  Suicidal ideation  Anxiety  Knee osteoarthritis  Cerebral palsy   Benign prostatic enlargement  GERD     On examination:  General examination: Overweight  Eyes: There is no pallor or jaundice pupils are equal and reactive to light  Neck: There is no carotid bruit or thyroid enlargement  Lungs: Clear to auscultation  CVs: Rhythm is regular there is no gallop murmur  Abdomen: Normal exam  Genitourinary system there is no CVA tenderness  CNS: No focal weakness  Legs: No edema    Assessment and plan  1.  Hypertension: Controlled on the current dose of Norvasc and lisinopril  3.  Hyperlipidemia continue current dose of simvastatin  3.  Gastroesophageal reflux disease symptoms are controlled on pantoprazole  4.  Benign prostatic hypertrophy with urinary retention: Resolved.  Continue with the current dose of tamsulosin and finasteride  5.  Hyperglycemia: Recent blood sugar levels are acceptable with dietary restrictions  6.  Smoking again counseled about smoking cessation educated about health risk associated with smoking  7.  Depression: Is stable with the current medications  8.  COPD: Stable on current inhalers

## 2024-12-01 NOTE — LETTER
Patient: Gonzalez Dinh  : 1945    Encounter Date: 2024    Patient is seen at [facility] today.  He is in usual state of health.  Denies any fever or chill.  Has no chest pain or palpitation.  Has no cough expectoration.  Denies any nausea vomiting pain abdomen.  He has no polyuria, polydipsia any other symptom uncontrolled hyperglycemia.  He did not have any difficulty passing urine after removal of the catheter.  He has recently seen his urologist.  He gets some knee pain off and on.  Has no weakness of any extremity.  Review of other systems are negative.    Past medical history:      Urinary retention      Phimosis  Hyperglycemia  Obesity  Hyperlipidemia  Anemia  Urethral foreign body  Bilateral hydronephrosis  Depression  History of tibia and fibula fracture  Obesity  Suicidal ideation  Anxiety  Knee osteoarthritis  Cerebral palsy   Benign prostatic enlargement  GERD     On examination:  General examination: Overweight  Eyes: There is no pallor or jaundice pupils are equal and reactive to light  Neck: There is no carotid bruit or thyroid enlargement  Lungs: Clear to auscultation  CVs: Rhythm is regular there is no gallop murmur  Abdomen: Normal exam  Genitourinary system there is no CVA tenderness  CNS: No focal weakness  Legs: No edema    Assessment and plan  1.  Hypertension: Controlled on the current dose of Norvasc and lisinopril  3.  Hyperlipidemia continue current dose of simvastatin  3.  Gastroesophageal reflux disease symptoms are controlled on pantoprazole  4.  Benign prostatic hypertrophy with urinary retention: Resolved.  Continue with the current dose of tamsulosin and finasteride  5.  Hyperglycemia: Recent blood sugar levels are acceptable with dietary restrictions  6.  Smoking again counseled about smoking cessation educated about health risk associated with smoking  7.  Depression: Is stable with the current medications  8.  COPD: Stable on current  inhalers      Electronically Signed By: Jose Hernandez MD   12/1/24  6:48 PM

## 2025-01-10 ENCOUNTER — NURSING HOME VISIT (OUTPATIENT)
Dept: POST ACUTE CARE | Facility: EXTERNAL LOCATION | Age: 80
End: 2025-01-10
Payer: MEDICARE

## 2025-01-10 DIAGNOSIS — N40.0 BENIGN PROSTATIC HYPERPLASIA WITHOUT LOWER URINARY TRACT SYMPTOMS: ICD-10-CM

## 2025-01-10 DIAGNOSIS — I10 PRIMARY HYPERTENSION: Primary | ICD-10-CM

## 2025-01-10 DIAGNOSIS — R33.9 URINARY RETENTION: ICD-10-CM

## 2025-01-10 DIAGNOSIS — E78.49 OTHER HYPERLIPIDEMIA: ICD-10-CM

## 2025-01-10 DIAGNOSIS — K21.9 GASTROESOPHAGEAL REFLUX DISEASE WITHOUT ESOPHAGITIS: ICD-10-CM

## 2025-01-10 DIAGNOSIS — F32.A CHRONIC DEPRESSION: ICD-10-CM

## 2025-01-10 DIAGNOSIS — J44.89 COPD (CHRONIC OBSTRUCTIVE PULMONARY DISEASE) WITH CHRONIC BRONCHITIS (MULTI): ICD-10-CM

## 2025-01-10 NOTE — LETTER
Patient: Gonzalez Dinh  : 1945    Encounter Date: 01/10/2025    And is seen at [facility] today.  He is comfortable.  Denies any difficulty passing urine.  Has no fever or chill.  Has no respiratory symptoms.  Denies any chest pain or palpitation.  Has no recent change in bowel habit.  Gets some knee pain off and on.  He has no polyuria, polydipsia any other symptom uncontrolled hyperglycemia.  Review of other systems are negative.    Past medical history:      Urinary retention      Phimosis  Hyperglycemia  Obesity  Hyperlipidemia  Anemia  Urethral foreign body  Bilateral hydronephrosis  Depression  History of tibia and fibula fracture  Obesity  Suicidal ideation  Anxiety  Knee osteoarthritis  Cerebral palsy   Benign prostatic enlargement  GERD    On examination:  General examination: There is no acute discomfort  Eyes: There is no pallor or jaundice pupils are equal and reactive to light  Neck: There is no JVD  Lungs: Breath sounds are normal  CVS: Heart sounds are regular there is no gallop murmur  Abdomen: Soft there is no tenderness  CNS: Moving all the 4 extremities.  Some weakness of the legs  Legs: Trace of ankle advise present    Assessment and plan:  1.  Hyperlipidemia: Continue simvastatin  2.  Hypertension: Controlled on the current dose of Norvasc, lisinopril.  3.  Gastroesophageal reflux disease symptoms are controlled on the pantoprazole  4.  History of benign prostatic hypertrophy patient is on tamsulosin and Proscar  5.  History of phimosis and urinary retention: Resolved patient has no difficulty passing urine without catheter  6.  Depression: Stable on current medications  7.  COPD: Is stable on current inhaler.        Electronically Signed By: Jose Hernandez MD   1/10/25  5:12 PM

## 2025-01-10 NOTE — PROGRESS NOTES
And is seen at [facility] today.  He is comfortable.  Denies any difficulty passing urine.  Has no fever or chill.  Has no respiratory symptoms.  Denies any chest pain or palpitation.  Has no recent change in bowel habit.  Gets some knee pain off and on.  He has no polyuria, polydipsia any other symptom uncontrolled hyperglycemia.  Review of other systems are negative.    Past medical history:      Urinary retention      Phimosis  Hyperglycemia  Obesity  Hyperlipidemia  Anemia  Urethral foreign body  Bilateral hydronephrosis  Depression  History of tibia and fibula fracture  Obesity  Suicidal ideation  Anxiety  Knee osteoarthritis  Cerebral palsy   Benign prostatic enlargement  GERD    On examination:  General examination: There is no acute discomfort  Eyes: There is no pallor or jaundice pupils are equal and reactive to light  Neck: There is no JVD  Lungs: Breath sounds are normal  CVS: Heart sounds are regular there is no gallop murmur  Abdomen: Soft there is no tenderness  CNS: Moving all the 4 extremities.  Some weakness of the legs  Legs: Trace of ankle advise present    Assessment and plan:  1.  Hyperlipidemia: Continue simvastatin  2.  Hypertension: Controlled on the current dose of Norvasc, lisinopril.  3.  Gastroesophageal reflux disease symptoms are controlled on the pantoprazole  4.  History of benign prostatic hypertrophy patient is on tamsulosin and Proscar  5.  History of phimosis and urinary retention: Resolved patient has no difficulty passing urine without catheter  6.  Depression: Stable on current medications  7.  COPD: Is stable on current inhaler.

## 2025-02-14 ENCOUNTER — NURSING HOME VISIT (OUTPATIENT)
Dept: POST ACUTE CARE | Facility: EXTERNAL LOCATION | Age: 80
End: 2025-02-14
Payer: MEDICARE

## 2025-02-14 DIAGNOSIS — E78.49 OTHER HYPERLIPIDEMIA: ICD-10-CM

## 2025-02-14 DIAGNOSIS — K21.9 GASTROESOPHAGEAL REFLUX DISEASE WITHOUT ESOPHAGITIS: ICD-10-CM

## 2025-02-14 DIAGNOSIS — F32.A CHRONIC DEPRESSION: ICD-10-CM

## 2025-02-14 DIAGNOSIS — J44.89 COPD (CHRONIC OBSTRUCTIVE PULMONARY DISEASE) WITH CHRONIC BRONCHITIS (MULTI): ICD-10-CM

## 2025-02-14 DIAGNOSIS — N40.0 BENIGN PROSTATIC HYPERPLASIA WITHOUT LOWER URINARY TRACT SYMPTOMS: ICD-10-CM

## 2025-02-14 DIAGNOSIS — I10 PRIMARY HYPERTENSION: Primary | ICD-10-CM

## 2025-02-14 DIAGNOSIS — R73.9 HYPERGLYCEMIA: ICD-10-CM

## 2025-02-14 NOTE — LETTER
Patient: Gonzalez Dinh  : 1945    Encounter Date: 2025     Patient is seen at [facility] today.  He denies any fever chill Anexsia.  Did not have any difficulty passing urine recently.  He has no chest pain or palpitation.  Has no shortness of breath or wheezing.  Denies any new onset weakness of any extremity.  He has no polyuria, polydipsia any other symptom uncontrolled hyperglycemia.  Denies any joint swelling or pain.  Review of other systems are negative.    Past medical history:      Urinary retention      Phimosis  Hyperglycemia  Obesity  Hyperlipidemia  Anemia  Urethral foreign body  Bilateral hydronephrosis  Depression  History of tibia and fibula fracture  Obesity  Suicidal ideation  Anxiety  Knee osteoarthritis  Cerebral palsy   Benign prostatic enlargement  GERD    On examination:  General examination: Overweight  Vital signs: Heart rate is 70/min, respiratory rate is 16, oxygen saturation is 96% on room air  Neck: There is no carotid bruit or JVD  Lungs: Clear to auscultation  CVS rhythm is regular there is no gallop murmur  Abdomen: Normal exam  Genitourinary system there is no CVA tenderness  Musculoskeletal system there is no joint swelling  Legs: No edema    Assessment and plan  1.  Hypertension: Controlled on the current dose of Norvasc and lisinopril  2.  Hyperlipidemia will continue with Zocor recent LDL level is within the range at 59.  3.  Hyperglycemia: Recent hemoglobin A1c level is 5.7  4.  History of benign prostatic hypertrophy: Did not have any recurrence of urinary retention.  He is on Proscar and tamsulosin.  5.  Depression: Stable on current medications  6.  Gastroesophageal reflux disease: Symptoms are controlled on pantoprazole  7.  COPD: Is stable on current inhalers.  Counseled about smoking cessation.      Electronically Signed By: Jose Hernandez MD   25  6:21 PM

## 2025-02-14 NOTE — PROGRESS NOTES
Patient is seen at [facility] today.  He denies any fever chill Anexsia.  Did not have any difficulty passing urine recently.  He has no chest pain or palpitation.  Has no shortness of breath or wheezing.  Denies any new onset weakness of any extremity.  He has no polyuria, polydipsia any other symptom uncontrolled hyperglycemia.  Denies any joint swelling or pain.  Review of other systems are negative.    Past medical history:      Urinary retention      Phimosis  Hyperglycemia  Obesity  Hyperlipidemia  Anemia  Urethral foreign body  Bilateral hydronephrosis  Depression  History of tibia and fibula fracture  Obesity  Suicidal ideation  Anxiety  Knee osteoarthritis  Cerebral palsy   Benign prostatic enlargement  GERD    On examination:  General examination: Overweight  Vital signs: Heart rate is 70/min, respiratory rate is 16, oxygen saturation is 96% on room air  Neck: There is no carotid bruit or JVD  Lungs: Clear to auscultation  CVS rhythm is regular there is no gallop murmur  Abdomen: Normal exam  Genitourinary system there is no CVA tenderness  Musculoskeletal system there is no joint swelling  Legs: No edema    Assessment and plan  1.  Hypertension: Controlled on the current dose of Norvasc and lisinopril  2.  Hyperlipidemia will continue with Zocor recent LDL level is within the range at 59.  3.  Hyperglycemia: Recent hemoglobin A1c level is 5.7  4.  History of benign prostatic hypertrophy: Did not have any recurrence of urinary retention.  He is on Proscar and tamsulosin.  5.  Depression: Stable on current medications  6.  Gastroesophageal reflux disease: Symptoms are controlled on pantoprazole  7.  COPD: Is stable on current inhalers.  Counseled about smoking cessation.

## 2025-03-07 ENCOUNTER — NURSING HOME VISIT (OUTPATIENT)
Dept: POST ACUTE CARE | Facility: EXTERNAL LOCATION | Age: 80
End: 2025-03-07
Payer: MEDICARE

## 2025-03-07 DIAGNOSIS — J44.89 COPD (CHRONIC OBSTRUCTIVE PULMONARY DISEASE) WITH CHRONIC BRONCHITIS (MULTI): ICD-10-CM

## 2025-03-07 DIAGNOSIS — E78.49 OTHER HYPERLIPIDEMIA: ICD-10-CM

## 2025-03-07 DIAGNOSIS — Z87.448 HISTORY OF URINARY TRACT OBSTRUCTION: ICD-10-CM

## 2025-03-07 DIAGNOSIS — F32.A CHRONIC DEPRESSION: ICD-10-CM

## 2025-03-07 DIAGNOSIS — F17.290 CIGAR SMOKER: ICD-10-CM

## 2025-03-07 DIAGNOSIS — K21.9 GASTROESOPHAGEAL REFLUX DISEASE WITHOUT ESOPHAGITIS: ICD-10-CM

## 2025-03-07 DIAGNOSIS — I10 PRIMARY HYPERTENSION: Primary | ICD-10-CM

## 2025-03-07 NOTE — PROGRESS NOTES
Patient is seen at [facility] today.  He is in usual state of health.  He denies any fever or chill.  Has no chest pain or palpitation.  Has some dry cough.  Unfortunately he continues to smoke 3 or 4 cigarettes daily.  He has no nausea matting pain abdomen.  Has no recurrence of urinary obstruction.  He has no new onset weakness of any extremity but his legs are weak.  He is in wheelchair mostly.  Review of other systems are negative.    Past medical history:      Urinary retention      Phimosis  Hyperglycemia  Obesity  Hyperlipidemia  Anemia  Urethral foreign body  Bilateral hydronephrosis  Depression  History of tibia and fibula fracture  Current cigar smoker  Obesity  Suicidal ideation  Anxiety  Knee osteoarthritis  Cerebral palsy   Benign prostatic enlargement  GERD    On examination:  General examination: There is no acute discomfort  Vital signs: Heart rate is 80/min, respiratory rate is 18, oxygen saturation is 95% on room air  Neck: There is no JVD or thyroid enlargement  Lungs: Clear to auscultation  CVS: Heart sounds are regular there is no gallop murmur  Abdomen: Soft there is no tenderness  CNS: Awake alert lower extremity weakness is unchanged  Legs: No edema    Assessment and plan  1.  Cigarette smoking: Patient is counseled about smoking cessation.  Also discussed screening for lung cancer however the patient is not interested  2.  Hypertension: Controlled on the current dose of Norvasc and lisinopril  3.  Hyperlipidemia: Continue with the current dose of simvastatin  4.  COPD: Is stable on current inhalers  5.  Depression: Stable on current dose of venlafaxine  6.  History of urinary obstruction and phimosis: No recurrence  7.  History of gastroesophageal reflux disease: Symptoms are infrequent he is not needing any medications on a regular basis.

## 2025-03-07 NOTE — LETTER
Patient: Gonzalez Dinh  : 1945    Encounter Date: 2025    Patient is seen at [facility] today.  He is in usual state of health.  He denies any fever or chill.  Has no chest pain or palpitation.  Has some dry cough.  Unfortunately he continues to smoke 3 or 4 cigarettes daily.  He has no nausea matting pain abdomen.  Has no recurrence of urinary obstruction.  He has no new onset weakness of any extremity but his legs are weak.  He is in wheelchair mostly.  Review of other systems are negative.    Past medical history:      Urinary retention      Phimosis  Hyperglycemia  Obesity  Hyperlipidemia  Anemia  Urethral foreign body  Bilateral hydronephrosis  Depression  History of tibia and fibula fracture  Current cigar smoker  Obesity  Suicidal ideation  Anxiety  Knee osteoarthritis  Cerebral palsy   Benign prostatic enlargement  GERD    On examination:  General examination: There is no acute discomfort  Vital signs: Heart rate is 80/min, respiratory rate is 18, oxygen saturation is 95% on room air  Neck: There is no JVD or thyroid enlargement  Lungs: Clear to auscultation  CVS: Heart sounds are regular there is no gallop murmur  Abdomen: Soft there is no tenderness  CNS: Awake alert lower extremity weakness is unchanged  Legs: No edema    Assessment and plan  1.  Cigarette smoking: Patient is counseled about smoking cessation.  Also discussed screening for lung cancer however the patient is not interested  2.  Hypertension: Controlled on the current dose of Norvasc and lisinopril  3.  Hyperlipidemia: Continue with the current dose of simvastatin  4.  COPD: Is stable on current inhalers  5.  Depression: Stable on current dose of venlafaxine  6.  History of urinary obstruction and phimosis: No recurrence  7.  History of gastroesophageal reflux disease: Symptoms are infrequent he is not needing any medications on a regular basis.      Electronically Signed By: Jose Hernandez MD   3/7/25  3:57  PM

## 2025-04-11 ENCOUNTER — NURSING HOME VISIT (OUTPATIENT)
Dept: POST ACUTE CARE | Facility: EXTERNAL LOCATION | Age: 80
End: 2025-04-11
Payer: MEDICARE

## 2025-04-11 DIAGNOSIS — K21.9 GASTROESOPHAGEAL REFLUX DISEASE WITHOUT ESOPHAGITIS: ICD-10-CM

## 2025-04-11 DIAGNOSIS — Z87.448 HISTORY OF URINARY TRACT OBSTRUCTION: ICD-10-CM

## 2025-04-11 DIAGNOSIS — F32.A CHRONIC DEPRESSION: ICD-10-CM

## 2025-04-11 DIAGNOSIS — E78.49 OTHER HYPERLIPIDEMIA: Primary | ICD-10-CM

## 2025-04-11 DIAGNOSIS — N40.0 BENIGN PROSTATIC HYPERPLASIA WITHOUT LOWER URINARY TRACT SYMPTOMS: ICD-10-CM

## 2025-04-11 DIAGNOSIS — J44.89 COPD (CHRONIC OBSTRUCTIVE PULMONARY DISEASE) WITH CHRONIC BRONCHITIS (MULTI): ICD-10-CM

## 2025-04-11 DIAGNOSIS — I10 PRIMARY HYPERTENSION: ICD-10-CM

## 2025-04-11 NOTE — PROGRESS NOTES
Mr. Dinh was seen at [facility] today.  He is comfortable.  Denies any urinary symptoms.  Has no fever or chills.  Has no respiratory symptoms.  Denies any chest pain or palpitation.  Has no polyuria, polydipsia any other symptom uncontrolled hyperglycemia.  Denies any joint swelling or pain.  Has some symptoms of anxiety.  Review of other systems are negative.    Past medical history:      Urinary retention      Phimosis  Hyperglycemia  Obesity  Hyperlipidemia  Anemia  Urethral foreign body  Bilateral hydronephrosis  Depression  History of tibia and fibula fracture  Current cigar smoker  Obesity  Suicidal ideation  Anxiety  Knee osteoarthritis  Cerebral palsy   GERD  PBH    On examination:  General examination: Overweight  Vital signs: Heart rate is 70/min, respiratory rate is 18, blood pressure is 128/67, oxygen saturation is 98% room air  Eyes: There is no pallor or jaundice  Neck: There is no JVD or carotid bruit  Lungs: Breath sounds are normal  CVS: Heart sounds are regular there is no gallop murmur  Abdomen: Soft  Legs: No edema    Assessment and plan:  1.  Hyperlipidemia: Continue with the current dose of simvastatin  2.  Hypertension: Controlled on the current dose of lisinopril and amlodipine  3.  COPD: Stable on current inhalers  4.  Depression: Symptoms are controlled on the current dose of venlafaxine  5. History of urinary retention: Patient is on tamsulosin.  He did not have any further episode  6.  Gastroesophageal reflux disease: Symptoms are controlled on the pantoprazole.

## 2025-05-02 ENCOUNTER — NURSING HOME VISIT (OUTPATIENT)
Dept: POST ACUTE CARE | Facility: EXTERNAL LOCATION | Age: 80
End: 2025-05-02
Payer: MEDICARE

## 2025-05-02 DIAGNOSIS — K21.9 GASTROESOPHAGEAL REFLUX DISEASE WITHOUT ESOPHAGITIS: ICD-10-CM

## 2025-05-02 DIAGNOSIS — I10 PRIMARY HYPERTENSION: ICD-10-CM

## 2025-05-02 DIAGNOSIS — E78.49 OTHER HYPERLIPIDEMIA: Primary | ICD-10-CM

## 2025-05-02 DIAGNOSIS — F32.A CHRONIC DEPRESSION: ICD-10-CM

## 2025-05-02 DIAGNOSIS — J44.89 COPD (CHRONIC OBSTRUCTIVE PULMONARY DISEASE) WITH CHRONIC BRONCHITIS (MULTI): ICD-10-CM

## 2025-05-02 NOTE — LETTER
Patient: Gonzalez Dinh  : 1945    Encounter Date: 2025    Patient is seen at [facility] today.  He is in usual state of health.  Denies any fever, chill, anorexia any constitutional symptoms.  Has no headache or visual disturbances.  Has no cough expectoration.  Denies any chest pain or palpitation.  Has no recent change in bowel habit.  He has chronic leg weakness.  He has no polyuria, polydipsia any other symptom uncontrolled hyperglycemia.  Review of other systems are negative.    Past medical history:      Urinary retention      Phimosis  Hyperglycemia  Obesity  Hyperlipidemia  Anemia  Urethral foreign body  Bilateral hydronephrosis  Depression  History of tibia and fibula fracture  Current cigar smoker  Obesity  Suicidal ideation  Anxiety  Knee osteoarthritis  Cerebral palsy   GERD  PBH    General examination: Overweight  Vital signs: Heart rate is 70/min, respiratory rate is 18, oxygen saturation 96% room air  Eyes: There is no pallor or jaundice pupils are reactive to light  Neck: There is no JVD or thyroid enlargement  Lungs: Clear to auscultation  CVS: Normal exam  Abdomen: Soft there is no tenderness  CNS: Leg weakness is present  Legs: Trace of ankle edema is present    Assessment and plan:  1.  Hypertension: Controlled on the current dose of lisinopril and amlodipine.  2.  COPD currently stable on current medications  3.  Gastroesophageal reflux disease: Current sinew pantoprazole  4.  Depression: Symptoms are controlled on venlafaxine  5.  Obesity: Diet excise activity discussed with the patient  6.  Hyperlipidemia: Continue with simvastatin    Electronically Signed By: Jose Hernandez MD   25  8:58 PM

## 2025-05-03 NOTE — PROGRESS NOTES
Patient is seen at [facility] today.  He is in usual state of health.  Denies any fever, chill, anorexia any constitutional symptoms.  Has no headache or visual disturbances.  Has no cough expectoration.  Denies any chest pain or palpitation.  Has no recent change in bowel habit.  He has chronic leg weakness.  He has no polyuria, polydipsia any other symptom uncontrolled hyperglycemia.  Review of other systems are negative.    Past medical history:      Urinary retention      Phimosis  Hyperglycemia  Obesity  Hyperlipidemia  Anemia  Urethral foreign body  Bilateral hydronephrosis  Depression  History of tibia and fibula fracture  Current cigar smoker  Obesity  Suicidal ideation  Anxiety  Knee osteoarthritis  Cerebral palsy   GERD  PBH    General examination: Overweight  Vital signs: Heart rate is 70/min, respiratory rate is 18, oxygen saturation 96% room air  Eyes: There is no pallor or jaundice pupils are reactive to light  Neck: There is no JVD or thyroid enlargement  Lungs: Clear to auscultation  CVS: Normal exam  Abdomen: Soft there is no tenderness  CNS: Leg weakness is present  Legs: Trace of ankle edema is present    Assessment and plan:  1.  Hypertension: Controlled on the current dose of lisinopril and amlodipine.  2.  COPD currently stable on current medications  3.  Gastroesophageal reflux disease: Current sinew pantoprazole  4.  Depression: Symptoms are controlled on venlafaxine  5.  Obesity: Diet excise activity discussed with the patient  6.  Hyperlipidemia: Continue with simvastatin

## 2025-06-07 ENCOUNTER — NURSING HOME VISIT (OUTPATIENT)
Dept: POST ACUTE CARE | Facility: EXTERNAL LOCATION | Age: 80
End: 2025-06-07
Payer: MEDICARE

## 2025-06-07 DIAGNOSIS — E78.49 OTHER HYPERLIPIDEMIA: Primary | ICD-10-CM

## 2025-06-07 DIAGNOSIS — K21.9 GASTROESOPHAGEAL REFLUX DISEASE WITHOUT ESOPHAGITIS: ICD-10-CM

## 2025-06-07 DIAGNOSIS — F32.A CHRONIC DEPRESSION: ICD-10-CM

## 2025-06-07 DIAGNOSIS — F17.290 CIGAR SMOKER: ICD-10-CM

## 2025-06-07 DIAGNOSIS — I10 PRIMARY HYPERTENSION: ICD-10-CM

## 2025-06-07 DIAGNOSIS — J44.89 COPD (CHRONIC OBSTRUCTIVE PULMONARY DISEASE) WITH CHRONIC BRONCHITIS (MULTI): ICD-10-CM

## 2025-06-07 NOTE — PROGRESS NOTES
Patient is seen at [facility] today.  He denies any fever or chills.  Has no chest pain or palpitation.  Has some dry cough.  Unfortunately he continues to smoke cigar.  Denies any nausea vomiting pain abdomen.  He has no new onset weakness of any extremity.  Has no urinary symptoms.  Did not have any further episode of urinary retention.  Review of other systems is negative.    Past medical history:      Urinary retention      Phimosis  Hyperglycemia  Obesity  Hyperlipidemia  Anemia  Urethral foreign body  Bilateral hydronephrosis  Depression  History of tibia and fibula fracture  Current cigar smoker  Obesity  Suicidal ideation  Anxiety  Knee osteoarthritis  Cerebral palsy   GERD  PBH    On examination:  General examination: Normal  Vital signs: Heart rate is 80/min, respiratory rate is 18, oxygen saturation 95% on room air  Neck: There is no JVD  Lungs: Breath sounds are normal  CVS: Rhythm is regular there is no murmur  Abdomen: Normal exam  CNS: Leg weakness as before  Legs: Trace of ankle advise present    Assessment and plan:  1.  Hyperlipidemia: Continue simvastatin  2.  Hypertension: Controlled on the current dose of lisinopril and Norvasc  3.  COPD: Stable.  Smoking counseling was done.  4.  Depression: Stable on current medications  5.  History of urinary retention: There is no urinary symptoms now  6.  Benign prostatic hypertrophy: Continue with the Flomax  7.  Gastroesophageal reflux disease: Symptoms are controlled on pantoprazole          
No

## 2025-06-07 NOTE — LETTER
Patient: Gonzalez Dinh  : 1945    Encounter Date: 2025    Patient is seen at  today.  He denies any fever or chills.  Has no chest pain or palpitation.  Has some dry cough.  Unfortunately he continues to smoke cigar.  Denies any nausea vomiting pain abdomen.  He has no new onset weakness of any extremity.  Has no urinary symptoms.  Did not have any further episode of urinary retention.  Review of other systems is negative.    Past medical history:      Urinary retention      Phimosis  Hyperglycemia  Obesity  Hyperlipidemia  Anemia  Urethral foreign body  Bilateral hydronephrosis  Depression  History of tibia and fibula fracture  Current cigar smoker  Obesity  Suicidal ideation  Anxiety  Knee osteoarthritis  Cerebral palsy   GERD  PBH    On examination:  General examination: Normal  Vital signs: Heart rate is 80/min, respiratory rate is 18, oxygen saturation 95% on room air  Neck: There is no JVD  Lungs: Breath sounds are normal  CVS: Rhythm is regular there is no murmur  Abdomen: Normal exam  CNS: Leg weakness as before  Legs: Trace of ankle advise present    Assessment and plan:  1.  Hyperlipidemia: Continue simvastatin  2.  Hypertension: Controlled on the current dose of lisinopril and Norvasc  3.  COPD: Stable.  Smoking counseling was done.  4.  Depression: Stable on current medications  5.  History of urinary retention: There is no urinary symptoms now  6.  Benign prostatic hypertrophy: Continue with the Flomax  7.  Gastroesophageal reflux disease: Symptoms are controlled on pantoprazole            Electronically Signed By: Jose Hernandez MD   25  5:04 PM

## 2025-07-03 ENCOUNTER — NURSING HOME VISIT (OUTPATIENT)
Dept: POST ACUTE CARE | Facility: EXTERNAL LOCATION | Age: 80
End: 2025-07-03
Payer: COMMERCIAL

## 2025-07-03 DIAGNOSIS — J44.89 COPD (CHRONIC OBSTRUCTIVE PULMONARY DISEASE) WITH CHRONIC BRONCHITIS (MULTI): ICD-10-CM

## 2025-07-03 DIAGNOSIS — I10 PRIMARY HYPERTENSION: Primary | ICD-10-CM

## 2025-07-03 DIAGNOSIS — R33.9 URINARY RETENTION: ICD-10-CM

## 2025-07-03 DIAGNOSIS — F32.A CHRONIC DEPRESSION: ICD-10-CM

## 2025-07-03 DIAGNOSIS — N40.0 BENIGN PROSTATIC HYPERPLASIA WITHOUT LOWER URINARY TRACT SYMPTOMS: ICD-10-CM

## 2025-07-03 DIAGNOSIS — E78.49 OTHER HYPERLIPIDEMIA: ICD-10-CM

## 2025-07-03 PROCEDURE — 99308 SBSQ NF CARE LOW MDM 20: CPT | Performed by: INTERNAL MEDICINE

## 2025-07-03 NOTE — LETTER
Patient: Gonzalez Dinh  : 1945    Encounter Date: 2025    Mr. Dinh is seen at the nursing facility today.  He is comfortable.  Denies any shortness of breath , cough or wheezing.  Continues to smoke.  He has no fever or chill.  Denies any chest pain or palpitation.  Has no nausea vomiting pain abdomen.  He has no polyuria, polydipsia any other symptom uncontrolled hyperglycemia.  Did not have any further episode of urinary retention.  Review of other systems are negative.    Past medical history:      Urinary retention      Phimosis  Hyperglycemia  Obesity  Hyperlipidemia  Anemia  Urethral foreign body  Bilateral hydronephrosis  Depression  History of tibia and fibula fracture  Current cigar smoker  Obesity  Suicidal ideation  Anxiety  Knee osteoarthritis  Cerebral palsy   GERD  PBH    On examination:  General examination: Overweight  Vital signs: Heart rate is 70/min, respiratory rate is 18, blood pressure is 128/67, oxygen saturation 96% on room air  Eyes: There is no pallor or jaundice pupils are equal and reactive to light  Oral cavity throat normal  Neck: There is no JVD or carotid bruit  Lungs: Clear to auscultation  CVS: Heart sounds are regular there is no gallop murmur  Abdomen: Soft there is no tenderness  CNS: Awake alert mild weakness of the leg is present  Legs: No edema  Skin: Warm well-perfused    Assessment and plan:  1.  Benign prostatic hypertrophy/history of urinary retention: Continue with the current dose of Proscar and tamsulosin  2.  Hypertension: Controlled on the current dose of Norvasc and lisinopril  3.  Hyperlipidemia continue simvastatin  4.  Gastroesophageal reflux disease: Controlled on the current dose of pantoprazole  5.  COPD: Stable on current inhalers  6.  Depression: Continue with the venlafaxine.  Patient is being followed by psychiatry.    Electronically Signed By: Jose Hernandez MD   7/3/25  9:02 PM

## 2025-07-04 NOTE — PROGRESS NOTES
Mr. Dinh is seen at the nursing facility today.  He is comfortable.  Denies any shortness of breath , cough or wheezing.  Continues to smoke.  He has no fever or chill.  Denies any chest pain or palpitation.  Has no nausea vomiting pain abdomen.  He has no polyuria, polydipsia any other symptom uncontrolled hyperglycemia.  Did not have any further episode of urinary retention.  Review of other systems are negative.    Past medical history:      Urinary retention      Phimosis  Hyperglycemia  Obesity  Hyperlipidemia  Anemia  Urethral foreign body  Bilateral hydronephrosis  Depression  History of tibia and fibula fracture  Current cigar smoker  Obesity  Suicidal ideation  Anxiety  Knee osteoarthritis  Cerebral palsy   GERD  PBH    On examination:  General examination: Overweight  Vital signs: Heart rate is 70/min, respiratory rate is 18, blood pressure is 128/67, oxygen saturation 96% on room air  Eyes: There is no pallor or jaundice pupils are equal and reactive to light  Oral cavity throat normal  Neck: There is no JVD or carotid bruit  Lungs: Clear to auscultation  CVS: Heart sounds are regular there is no gallop murmur  Abdomen: Soft there is no tenderness  CNS: Awake alert mild weakness of the leg is present  Legs: No edema  Skin: Warm well-perfused    Assessment and plan:  1.  Benign prostatic hypertrophy/history of urinary retention: Continue with the current dose of Proscar and tamsulosin  2.  Hypertension: Controlled on the current dose of Norvasc and lisinopril  3.  Hyperlipidemia continue simvastatin  4.  Gastroesophageal reflux disease: Controlled on the current dose of pantoprazole  5.  COPD: Stable on current inhalers  6.  Depression: Continue with the venlafaxine.  Patient is being followed by psychiatry.

## 2025-08-08 ENCOUNTER — NURSING HOME VISIT (OUTPATIENT)
Dept: POST ACUTE CARE | Facility: EXTERNAL LOCATION | Age: 80
End: 2025-08-08
Payer: COMMERCIAL

## 2025-08-08 DIAGNOSIS — K21.9 GASTROESOPHAGEAL REFLUX DISEASE WITHOUT ESOPHAGITIS: ICD-10-CM

## 2025-08-08 DIAGNOSIS — N40.0 BENIGN PROSTATIC HYPERPLASIA WITHOUT LOWER URINARY TRACT SYMPTOMS: ICD-10-CM

## 2025-08-08 DIAGNOSIS — F17.290 CIGAR SMOKER: ICD-10-CM

## 2025-08-08 DIAGNOSIS — I10 PRIMARY HYPERTENSION: ICD-10-CM

## 2025-08-08 DIAGNOSIS — R33.9 URINARY RETENTION: ICD-10-CM

## 2025-08-08 DIAGNOSIS — J44.89 COPD (CHRONIC OBSTRUCTIVE PULMONARY DISEASE) WITH CHRONIC BRONCHITIS (MULTI): ICD-10-CM

## 2025-08-08 DIAGNOSIS — F32.A CHRONIC DEPRESSION: ICD-10-CM

## 2025-08-08 DIAGNOSIS — E78.49 OTHER HYPERLIPIDEMIA: Primary | ICD-10-CM

## 2025-08-08 PROCEDURE — 99308 SBSQ NF CARE LOW MDM 20: CPT | Performed by: INTERNAL MEDICINE

## 2025-08-08 NOTE — PROGRESS NOTES
Patient is seen at [facility] today.  He is sitting comfortably in his wheelchair.  Denies any fever or chills.  No cough expectoration.  Continues to smoke.  Denies any chest pain or palpitation.  Has no nausea matting pain abdomen.  Has chronic leg weakness.  Denies any difficulty passing urine.  He denies any symptom of major depression.  Review of other systems are negative.    Past medical history:      Urinary retention      Phimosis  Hyperglycemia  Obesity  Hyperlipidemia  Anemia  Urethral foreign body  Bilateral hydronephrosis  Depression  History of tibia and fibula fracture  Current cigar smoker  Obesity  Suicidal ideation  Anxiety  Knee osteoarthritis  Cerebral palsy   GERD  BPH    On examination:  General examination: Comfortable  Vital signs: Heart rate is 70/min, respiratory rate is 18, blood pressure is 128/62, oxygen saturation 98% room air  Eyes: There is no pallor or jaundice pupils are equal and reactive to light  Neck: There is no JVD or thyroid enlargement  Lungs: Breath sounds are normal  CVS: Normal exam  Abdomen: There is no organomegaly or tenderness  CNS: Awake alert.  Weakness of the legs present  Legs: Trace of ankle edema is present  Skin: Warm and well-perfused  Mood and behavior: Normal    Assessment and plan:  1.  Gastroesophageal reflux disease: Symptoms are controlled on pantoprazole  2.  Hypertension: Controlled on the current dose of amlodipine and lisinopril  3.  History of urinary retention: Patient has no recent issues he is on tamsulosin and Proscar  4.  COPD: Stable on current inhaler  5.  Depression: Stable on current dose of venlafaxine  6.  Hyperlipidemia: Continue simvastatin

## 2025-08-08 NOTE — LETTER
Patient: Gonzalez Dinh  : 1945    Encounter Date: 2025    Patient is seen at [facility] today.  He is sitting comfortably in his wheelchair.  Denies any fever or chills.  No cough expectoration.  Continues to smoke.  Denies any chest pain or palpitation.  Has no nausea matting pain abdomen.  Has chronic leg weakness.  Denies any difficulty passing urine.  He denies any symptom of major depression.  Review of other systems are negative.    Past medical history:      Urinary retention      Phimosis  Hyperglycemia  Obesity  Hyperlipidemia  Anemia  Urethral foreign body  Bilateral hydronephrosis  Depression  History of tibia and fibula fracture  Current cigar smoker  Obesity  Suicidal ideation  Anxiety  Knee osteoarthritis  Cerebral palsy   GERD  BPH    On examination:  General examination: Comfortable  Vital signs: Heart rate is 70/min, respiratory rate is 18, blood pressure is 128/62, oxygen saturation 98% room air  Eyes: There is no pallor or jaundice pupils are equal and reactive to light  Neck: There is no JVD or thyroid enlargement  Lungs: Breath sounds are normal  CVS: Normal exam  Abdomen: There is no organomegaly or tenderness  CNS: Awake alert.  Weakness of the legs present  Legs: Trace of ankle edema is present  Skin: Warm and well-perfused  Mood and behavior: Normal    Assessment and plan:  1.  Gastroesophageal reflux disease: Symptoms are controlled on pantoprazole  2.  Hypertension: Controlled on the current dose of amlodipine and lisinopril  3.  History of urinary retention: Patient has no recent issues he is on tamsulosin and Proscar  4.  COPD: Stable on current inhaler  5.  Depression: Stable on current dose of venlafaxine  6.  Hyperlipidemia: Continue simvastatin    Electronically Signed By: Jose Hernandez MD   25  6:49 PM

## 2025-08-22 ENCOUNTER — NURSING HOME VISIT (OUTPATIENT)
Dept: POST ACUTE CARE | Facility: EXTERNAL LOCATION | Age: 80
End: 2025-08-22
Payer: COMMERCIAL

## 2025-08-22 DIAGNOSIS — N40.0 BENIGN PROSTATIC HYPERPLASIA WITHOUT LOWER URINARY TRACT SYMPTOMS: ICD-10-CM

## 2025-08-22 DIAGNOSIS — K21.9 GASTROESOPHAGEAL REFLUX DISEASE WITHOUT ESOPHAGITIS: ICD-10-CM

## 2025-08-22 DIAGNOSIS — J90 PLEURAL EFFUSION, LEFT: ICD-10-CM

## 2025-08-22 DIAGNOSIS — J18.9 PNEUMONIA OF LEFT LOWER LOBE DUE TO INFECTIOUS ORGANISM: Primary | ICD-10-CM

## 2025-08-22 DIAGNOSIS — R06.2 WHEEZING: ICD-10-CM

## 2025-08-22 DIAGNOSIS — R73.9 HYPERGLYCEMIA: ICD-10-CM

## 2025-08-22 DIAGNOSIS — J44.89 COPD (CHRONIC OBSTRUCTIVE PULMONARY DISEASE) WITH CHRONIC BRONCHITIS (MULTI): ICD-10-CM

## 2025-08-22 DIAGNOSIS — E78.49 OTHER HYPERLIPIDEMIA: ICD-10-CM

## 2025-08-22 PROCEDURE — 99309 SBSQ NF CARE MODERATE MDM 30: CPT | Performed by: INTERNAL MEDICINE

## 2025-09-03 ENCOUNTER — NURSING HOME VISIT (OUTPATIENT)
Dept: POST ACUTE CARE | Facility: EXTERNAL LOCATION | Age: 80
End: 2025-09-03
Payer: COMMERCIAL

## 2025-09-03 DIAGNOSIS — K21.9 GASTROESOPHAGEAL REFLUX DISEASE WITHOUT ESOPHAGITIS: ICD-10-CM

## 2025-09-03 DIAGNOSIS — F32.A CHRONIC DEPRESSION: ICD-10-CM

## 2025-09-03 DIAGNOSIS — I10 PRIMARY HYPERTENSION: ICD-10-CM

## 2025-09-03 DIAGNOSIS — N40.0 BENIGN PROSTATIC HYPERPLASIA WITHOUT LOWER URINARY TRACT SYMPTOMS: ICD-10-CM

## 2025-09-03 DIAGNOSIS — F17.290 CIGAR SMOKER: ICD-10-CM

## 2025-09-03 DIAGNOSIS — J18.9 PNEUMONIA OF LEFT LOWER LOBE DUE TO INFECTIOUS ORGANISM: Primary | ICD-10-CM

## 2025-09-03 DIAGNOSIS — R33.9 URINARY RETENTION: ICD-10-CM

## 2025-09-03 DIAGNOSIS — E78.49 OTHER HYPERLIPIDEMIA: ICD-10-CM

## 2025-09-03 PROCEDURE — 99308 SBSQ NF CARE LOW MDM 20: CPT | Performed by: INTERNAL MEDICINE

## 2025-09-12 ENCOUNTER — APPOINTMENT (OUTPATIENT)
Dept: CARDIOLOGY | Facility: CLINIC | Age: 80
End: 2025-09-12
Payer: COMMERCIAL